# Patient Record
Sex: FEMALE | Race: WHITE | NOT HISPANIC OR LATINO
[De-identification: names, ages, dates, MRNs, and addresses within clinical notes are randomized per-mention and may not be internally consistent; named-entity substitution may affect disease eponyms.]

---

## 2018-10-29 ENCOUNTER — APPOINTMENT (OUTPATIENT)
Dept: INTERNAL MEDICINE | Facility: CLINIC | Age: 53
End: 2018-10-29
Payer: COMMERCIAL

## 2018-10-29 ENCOUNTER — NON-APPOINTMENT (OUTPATIENT)
Age: 53
End: 2018-10-29

## 2018-10-29 VITALS
TEMPERATURE: 98.3 F | SYSTOLIC BLOOD PRESSURE: 120 MMHG | WEIGHT: 143 LBS | HEART RATE: 79 BPM | BODY MASS INDEX: 28.45 KG/M2 | DIASTOLIC BLOOD PRESSURE: 70 MMHG | OXYGEN SATURATION: 98 % | HEIGHT: 59.5 IN

## 2018-10-29 DIAGNOSIS — Z82.69 FAMILY HISTORY OF OTHER DISEASES OF THE MUSCULOSKELETAL SYSTEM AND CONNECTIVE TISSUE: ICD-10-CM

## 2018-10-29 DIAGNOSIS — M19.049 PRIMARY OSTEOARTHRITIS, UNSPECIFIED HAND: ICD-10-CM

## 2018-10-29 DIAGNOSIS — Z11.3 ENCOUNTER FOR SCREENING FOR INFECTIONS WITH A PREDOMINANTLY SEXUAL MODE OF TRANSMISSION: ICD-10-CM

## 2018-10-29 DIAGNOSIS — L30.9 DERMATITIS, UNSPECIFIED: ICD-10-CM

## 2018-10-29 PROCEDURE — G0444 DEPRESSION SCREEN ANNUAL: CPT

## 2018-10-29 PROCEDURE — 36415 COLL VENOUS BLD VENIPUNCTURE: CPT

## 2018-10-29 PROCEDURE — 93000 ELECTROCARDIOGRAM COMPLETE: CPT

## 2018-10-29 PROCEDURE — 99386 PREV VISIT NEW AGE 40-64: CPT | Mod: 25

## 2018-10-29 NOTE — HEALTH RISK ASSESSMENT
[No falls in past year] : Patient reported no falls in the past year [0] : 2) Feeling down, depressed, or hopeless: Not at all (0) [Patient reported mammogram was normal] : Patient reported mammogram was normal [Patient reported PAP Smear was normal] : Patient reported PAP Smear was normal [Patient declined colonoscopy] : Patient declined colonoscopy [HIV Test offered] : HIV Test offered [Hepatitis C test offered] : Hepatitis C test offered [None] : None [Employed] : employed [] :  [] : No [MammogramDate] : 12/17 [PapSmearDate] : 12/17

## 2018-10-29 NOTE — HISTORY OF PRESENT ILLNESS
[de-identified] : 54 y/o woman presents for initial visit to [facility] care w internal medicine. she feels well overall currently. \par her only recurrent concern is some joint deformity of R hand w index finger the most prominent w nodules at DIP joint. mild pain occasionally, no swelling. she says she saw an ortho hand specialist who indicated it is likely due to OA and 'nothing to do for it'. family hx of OA. she has no other joint pains. she does note tingling and mild numbness in R first 3 digits at the distal tip at times. she notes she sleeps on her hand frequently. \par she has not seen an internal medicine MD in many years, no recent lab work done. \par \par she usually sees her GYN yearly, and has been UTD w mammography done at a center in [place]. \par she has not had screening colonoscopy as yet and she is hesitant to do so

## 2018-10-29 NOTE — ASSESSMENT
[FreeTextEntry1] : discussed w pt \par \par check routine labs as below. she would like STD screening. \par diet, exercise , some weight loss advised \par \par reminded her to see GYN and have mammography done this year \par \par she declines influenza or other vaccines \par \par discussed OA of hands. advised trial of wearing wrist brace for stability, possibly CTS. referred to rhmeumatology if she has worsening symptoms, gave info. may use NSAIDs prn for mild joint pains. check ESR, CCP, RF as precaution, though on exam most c/w OA and not RA. \par \par will give trial of medium potency steroid for L LE likely eczema \par \par advised on need for screening colonoscopy and importance of this. referred to GI, she plans to make appt for initial consult. \par \par RTO 6 months for routine f/u or earlier prn if any new concerns

## 2018-10-29 NOTE — PHYSICAL EXAM
[No Acute Distress] : no acute distress [Well Nourished] : well nourished [Well Developed] : well developed [Well-Appearing] : well-appearing [Normal Voice/Communication] : normal voice/communication [Normal Sclera/Conjunctiva] : normal sclera/conjunctiva [PERRL] : pupils equal round and reactive to light [Normal Outer Ear/Nose] : the outer ears and nose were normal in appearance [Normal Oropharynx] : the oropharynx was normal [Normal TMs] : both tympanic membranes were normal [No JVD] : no jugular venous distention [Supple] : supple [No Lymphadenopathy] : no lymphadenopathy [Thyroid Normal, No Nodules] : the thyroid was normal and there were no nodules present [No Respiratory Distress] : no respiratory distress  [Clear to Auscultation] : lungs were clear to auscultation bilaterally [No Accessory Muscle Use] : no accessory muscle use [Normal Rate] : normal rate  [Regular Rhythm] : with a regular rhythm [Normal S1, S2] : normal S1 and S2 [No Murmur] : no murmur heard [No Carotid Bruits] : no carotid bruits [No Abdominal Bruit] : a ~M bruit was not heard ~T in the abdomen [No Varicosities] : no varicosities [Pedal Pulses Present] : the pedal pulses are present [No Edema] : there was no peripheral edema [No Extremity Clubbing/Cyanosis] : no extremity clubbing/cyanosis [Soft] : abdomen soft [Non Tender] : non-tender [Non-distended] : non-distended [No Masses] : no abdominal mass palpated [No HSM] : no HSM [Normal Bowel Sounds] : normal bowel sounds [Normal Supraclavicular Nodes] : no supraclavicular lymphadenopathy [Normal Posterior Cervical Nodes] : no posterior cervical lymphadenopathy [Normal Anterior Cervical Nodes] : no anterior cervical lymphadenopathy [No Spinal Tenderness] : no spinal tenderness [Grossly Normal Strength/Tone] : grossly normal strength/tone [Normal Gait] : normal gait [Coordination Grossly Intact] : coordination grossly intact [No Focal Deficits] : no focal deficits [Deep Tendon Reflexes (DTR)] : deep tendon reflexes were 2+ and symmetric [Speech Grossly Normal] : speech grossly normal [Normal Affect] : the affect was normal [Normal Mood] : the mood was normal [Normal Insight/Judgement] : insight and judgment were intact [de-identified] : 1st DIP joint w significant deviation and apparent heberdens nodes, R index finger worse than L. no warmth or synovitis  [de-identified] : small erythematous patch L lower leg w excoriations c/w eczema

## 2018-10-29 NOTE — REVIEW OF SYSTEMS
[Negative] : Heme/Lymph [Joint Stiffness] : joint stiffness [Joint Swelling] : no joint swelling [Muscle Weakness] : no muscle weakness [Muscle Pain] : no muscle pain [Back Pain] : no back pain [Itching] : Itching [Mole Changes] : no mole changes [Hair Changes] : no hair changes [Skin Rash] : skin rash

## 2019-01-24 LAB
25(OH)D3 SERPL-MCNC: 24.8 NG/ML
ALBUMIN SERPL ELPH-MCNC: 4.3 G/DL
ALP BLD-CCNC: 94 U/L
ALT SERPL-CCNC: 23 U/L
ANION GAP SERPL CALC-SCNC: 14 MMOL/L
APPEARANCE: CLEAR
AST SERPL-CCNC: 23 U/L
BASOPHILS # BLD AUTO: 0.04 K/UL
BASOPHILS NFR BLD AUTO: 0.6 %
BILIRUB SERPL-MCNC: 0.2 MG/DL
BILIRUBIN URINE: NEGATIVE
BLOOD URINE: NEGATIVE
BUN SERPL-MCNC: 14 MG/DL
C TRACH RRNA SPEC QL NAA+PROBE: NOT DETECTED
CALCIUM SERPL-MCNC: 9.3 MG/DL
CCP AB SER IA-ACNC: <8 UNITS
CHLORIDE SERPL-SCNC: 106 MMOL/L
CHOLEST SERPL-MCNC: 210 MG/DL
CHOLEST/HDLC SERPL: 3 RATIO
CO2 SERPL-SCNC: 24 MMOL/L
COLOR: YELLOW
CREAT SERPL-MCNC: 0.72 MG/DL
EOSINOPHIL # BLD AUTO: 0.39 K/UL
EOSINOPHIL NFR BLD AUTO: 5.9 %
ERYTHROCYTE [SEDIMENTATION RATE] IN BLOOD BY WESTERGREN METHOD: 6 MM/HR
GLUCOSE QUALITATIVE U: NEGATIVE MG/DL
GLUCOSE SERPL-MCNC: 76 MG/DL
HBA1C MFR BLD HPLC: 5.6 %
HCT VFR BLD CALC: 40.8 %
HCV AB SER QL: NONREACTIVE
HCV S/CO RATIO: 0.17 S/CO
HDLC SERPL-MCNC: 71 MG/DL
HGB BLD-MCNC: 13.2 G/DL
HIV1+2 AB SPEC QL IA.RAPID: NONREACTIVE
IMM GRANULOCYTES NFR BLD AUTO: 0.2 %
KETONES URINE: NEGATIVE
LDLC SERPL CALC-MCNC: 129 MG/DL
LEUKOCYTE ESTERASE URINE: NEGATIVE
LYMPHOCYTES # BLD AUTO: 2 K/UL
LYMPHOCYTES NFR BLD AUTO: 30.3 %
MAN DIFF?: NORMAL
MCHC RBC-ENTMCNC: 30.3 PG
MCHC RBC-ENTMCNC: 32.4 GM/DL
MCV RBC AUTO: 93.6 FL
MONOCYTES # BLD AUTO: 0.46 K/UL
MONOCYTES NFR BLD AUTO: 7 %
N GONORRHOEA RRNA SPEC QL NAA+PROBE: NOT DETECTED
NEUTROPHILS # BLD AUTO: 3.69 K/UL
NEUTROPHILS NFR BLD AUTO: 56 %
NITRITE URINE: NEGATIVE
PH URINE: 6
PLATELET # BLD AUTO: 203 K/UL
POTASSIUM SERPL-SCNC: 4.4 MMOL/L
PROT SERPL-MCNC: 7 G/DL
PROTEIN URINE: NEGATIVE MG/DL
RBC # BLD: 4.36 M/UL
RBC # FLD: 14.2 %
RF+CCP IGG SER-IMP: NEGATIVE
RHEUMATOID FACT SER QL: <10 IU/ML
SODIUM SERPL-SCNC: 144 MMOL/L
SOURCE AMPLIFICATION: NORMAL
SPECIFIC GRAVITY URINE: 1.01
T PALLIDUM AB SER QL IA: NEGATIVE
T4 FREE SERPL-MCNC: 1.2 NG/DL
TRIGL SERPL-MCNC: 51 MG/DL
TSH SERPL-ACNC: 1.32 UIU/ML
UROBILINOGEN URINE: NEGATIVE MG/DL
WBC # FLD AUTO: 6.59 K/UL

## 2019-12-10 ENCOUNTER — APPOINTMENT (OUTPATIENT)
Dept: ULTRASOUND IMAGING | Facility: CLINIC | Age: 54
End: 2019-12-10
Payer: COMMERCIAL

## 2019-12-10 ENCOUNTER — OUTPATIENT (OUTPATIENT)
Dept: OUTPATIENT SERVICES | Facility: HOSPITAL | Age: 54
LOS: 1 days | End: 2019-12-10
Payer: COMMERCIAL

## 2019-12-10 DIAGNOSIS — Z00.8 ENCOUNTER FOR OTHER GENERAL EXAMINATION: ICD-10-CM

## 2019-12-10 PROCEDURE — 20604 DRAIN/INJ JOINT/BURSA W/US: CPT

## 2019-12-10 PROCEDURE — 20604 DRAIN/INJ JOINT/BURSA W/US: CPT | Mod: LT

## 2020-02-18 ENCOUNTER — APPOINTMENT (OUTPATIENT)
Dept: SURGERY | Facility: CLINIC | Age: 55
End: 2020-02-18

## 2020-02-25 ENCOUNTER — APPOINTMENT (OUTPATIENT)
Dept: BREAST CENTER | Facility: CLINIC | Age: 55
End: 2020-02-25
Payer: COMMERCIAL

## 2020-02-25 VITALS
TEMPERATURE: 97.9 F | WEIGHT: 143 LBS | DIASTOLIC BLOOD PRESSURE: 82 MMHG | SYSTOLIC BLOOD PRESSURE: 146 MMHG | BODY MASS INDEX: 28.07 KG/M2 | HEIGHT: 60 IN | HEART RATE: 78 BPM

## 2020-02-25 DIAGNOSIS — N64.59 OTHER SIGNS AND SYMPTOMS IN BREAST: ICD-10-CM

## 2020-02-25 DIAGNOSIS — Z87.39 PERSONAL HISTORY OF OTHER DISEASES OF THE MUSCULOSKELETAL SYSTEM AND CONNECTIVE TISSUE: ICD-10-CM

## 2020-02-25 DIAGNOSIS — N63.20 UNSPECIFIED LUMP IN THE LEFT BREAST, UNSPECIFIED QUADRANT: ICD-10-CM

## 2020-02-25 PROCEDURE — 99204 OFFICE O/P NEW MOD 45 MIN: CPT

## 2020-02-26 PROBLEM — N63.20 LEFT BREAST LUMP: Status: ACTIVE | Noted: 2020-02-25

## 2020-02-26 PROBLEM — Z87.39 HISTORY OF ARTHRITIS: Status: RESOLVED | Noted: 2020-02-25 | Resolved: 2020-02-26

## 2020-02-26 NOTE — HISTORY OF PRESENT ILLNESS
[FreeTextEntry1] : I had the pleasure of seeing AUDREY DILLON  in the office today for a new breast evaluation secondary to palpable right breast lump.\par \par Audrey is  a wanda 53 yo female who is in overall good health.  She states she felt the left breast lump in January.  The palpable left breast mass measuring 1 cm at the 11 o'clock region.  She is here for further evaluation and work up of nodule.\par \par She denies skin changes or nipple discharge. She denies headaches, blurry vision, chest pain, SOB, abdominal pain, joint aches or difficult walking.  Palpable nodule in the left breast 11 area.\par \par  with 1st delivery at 30.  Menses began at age 12.\par She denies personal history of malignancy.\par She denies family history of malignancy.\par \par Imaging:  [facility]\par 6/10/2019  Bilateral screening mammogram and ultrasound\par Impression:  No mammographic or sonographic evidence of malignancy.  BIRADS 2 benign\par \par We reviewed and discussed clinical exam and recent imaging.  There is a palpable 1 cm nodule in the left breast 11 area 10 cmFN.  It is freely mobile and unattached to the chest wall.  She states she felt the nodule get bigger in 2020.  Recommendation for left breast ultrasound to evaluate nodule and will discuss result via mobile.  If imaging is benign then follow up in 6 months for clinical breast exam.  She understands and agrees to plan.  All questions answered.

## 2020-02-26 NOTE — PHYSICAL EXAM
[EOMI] : extra ocular movement intact [Atraumatic] : atraumatic [Normocephalic] : normocephalic [PERRL] : pupils equal, round and reactive to light [Sclera nonicteric] : sclera nonicteric [Supple] : supple [Examined in the supine and seated position] : examined in the supine and seated position [No Supraclavicular Adenopathy] : no supraclavicular adenopathy [No dominant masses] : no dominant masses left breast [No dominant masses] : no dominant masses in right breast  [No Nipple Retraction] : no left nipple retraction [No Nipple Discharge] : no left nipple discharge [Breast Nipple Inversion] : nipples not inverted [Breast Nipple Retraction] : nipples not retracted [Breast Nipple Flattening] : nipples not flattened [Breast Nipple Fissures] : nipples not fissured [Breast Abnormal Lactation (Galactorrhea)] : no galactorrhea [Breast Abnormal Secretion Bloody Fluid] : no bloody discharge [Breast Abnormal Secretion Serous Fluid] : no serous discharge [Breast Abnormal Secretion Opalescent Fluid] : no milky discharge [No Axillary Lymphadenopathy] : no left axillary lymphadenopathy [No Ulceration] : no ulceration [No Rashes] : no rashes [No Edema] : no edema [de-identified] : No supraclavicular or axillary adenopathy. No dominant masses, normal to palpation. Everted nipple without discharge. No skin changes.\par \par  [de-identified] : No supraclavicular or axillary adenopathy. Left 11:00 1 cm nodule. Everted nipple without discharge. No skin changes.\par \par

## 2020-02-26 NOTE — ASSESSMENT
[FreeTextEntry1] : 55 yo presents for palpable left breast lump in 11 o'clcok area.  This correlates with findings on ultrasound from 6/10/2019 as a 7 mm sebaceous cyst.  Given patient states this has grown in size, recommendation for ultrasound to reevaluate the area.  \par 1.  US breast to evaluate palpable lump\par 2.  Follow up in 6 months if stable and benign

## 2020-08-24 ENCOUNTER — APPOINTMENT (OUTPATIENT)
Dept: SURGERY | Facility: CLINIC | Age: 55
End: 2020-08-24

## 2020-10-20 ENCOUNTER — APPOINTMENT (OUTPATIENT)
Dept: INTERNAL MEDICINE | Facility: CLINIC | Age: 55
End: 2020-10-20
Payer: COMMERCIAL

## 2020-10-20 ENCOUNTER — NON-APPOINTMENT (OUTPATIENT)
Age: 55
End: 2020-10-20

## 2020-10-20 ENCOUNTER — LABORATORY RESULT (OUTPATIENT)
Age: 55
End: 2020-10-20

## 2020-10-20 VITALS
WEIGHT: 139.6 LBS | TEMPERATURE: 98.3 F | SYSTOLIC BLOOD PRESSURE: 130 MMHG | BODY MASS INDEX: 27.41 KG/M2 | HEART RATE: 83 BPM | HEIGHT: 60 IN | DIASTOLIC BLOOD PRESSURE: 74 MMHG | OXYGEN SATURATION: 97 %

## 2020-10-20 DIAGNOSIS — Z00.00 ENCOUNTER FOR GENERAL ADULT MEDICAL EXAMINATION W/OUT ABNORMAL FINDINGS: ICD-10-CM

## 2020-10-20 DIAGNOSIS — N60.19 DIFFUSE CYSTIC MASTOPATHY OF UNSPECIFIED BREAST: ICD-10-CM

## 2020-10-20 DIAGNOSIS — Z11.59 ENCOUNTER FOR SCREENING FOR OTHER VIRAL DISEASES: ICD-10-CM

## 2020-10-20 PROCEDURE — 36415 COLL VENOUS BLD VENIPUNCTURE: CPT

## 2020-10-20 PROCEDURE — 99396 PREV VISIT EST AGE 40-64: CPT | Mod: 25

## 2020-10-20 PROCEDURE — 93000 ELECTROCARDIOGRAM COMPLETE: CPT | Mod: 59

## 2020-10-20 PROCEDURE — 99072 ADDL SUPL MATRL&STAF TM PHE: CPT

## 2020-10-20 PROCEDURE — G0444 DEPRESSION SCREEN ANNUAL: CPT | Mod: NC,59

## 2020-10-20 NOTE — HEALTH RISK ASSESSMENT
[No falls in past year] : Patient reported no falls in the past year [0] : 2) Feeling down, depressed, or hopeless: Not at all (0) [Patient reported mammogram was normal] : Patient reported mammogram was normal [Patient reported PAP Smear was normal] : Patient reported PAP Smear was normal [Patient declined colonoscopy] : Patient declined colonoscopy [Employed] : employed [None] : None [] :  [No] : In the past 12 months have you used drugs other than those required for medical reasons? No [] : No [PapSmearDate] : 09/20 [MammogramDate] : 10/20

## 2020-10-20 NOTE — PHYSICAL EXAM
[No Acute Distress] : no acute distress [Well Nourished] : well nourished [Well Developed] : well developed [Well-Appearing] : well-appearing [Normal Voice/Communication] : normal voice/communication [Normal Sclera/Conjunctiva] : normal sclera/conjunctiva [PERRL] : pupils equal round and reactive to light [Normal Outer Ear/Nose] : the outer ears and nose were normal in appearance [Normal Oropharynx] : the oropharynx was normal [Normal TMs] : both tympanic membranes were normal [No JVD] : no jugular venous distention [Supple] : supple [No Lymphadenopathy] : no lymphadenopathy [Thyroid Normal, No Nodules] : the thyroid was normal and there were no nodules present [No Respiratory Distress] : no respiratory distress  [Clear to Auscultation] : lungs were clear to auscultation bilaterally [No Accessory Muscle Use] : no accessory muscle use [Normal Rate] : normal rate  [Regular Rhythm] : with a regular rhythm [Normal S1, S2] : normal S1 and S2 [No Murmur] : no murmur heard [No Abdominal Bruit] : a ~M bruit was not heard ~T in the abdomen [No Carotid Bruits] : no carotid bruits [No Varicosities] : no varicosities [Pedal Pulses Present] : the pedal pulses are present [No Edema] : there was no peripheral edema [No Extremity Clubbing/Cyanosis] : no extremity clubbing/cyanosis [Non Tender] : non-tender [Soft] : abdomen soft [Non-distended] : non-distended [No Masses] : no abdominal mass palpated [No HSM] : no HSM [Normal Bowel Sounds] : normal bowel sounds [Normal Supraclavicular Nodes] : no supraclavicular lymphadenopathy [Normal Posterior Cervical Nodes] : no posterior cervical lymphadenopathy [Normal Anterior Cervical Nodes] : no anterior cervical lymphadenopathy [No Spinal Tenderness] : no spinal tenderness [Grossly Normal Strength/Tone] : grossly normal strength/tone [Normal Gait] : normal gait [Coordination Grossly Intact] : coordination grossly intact [No Focal Deficits] : no focal deficits [Deep Tendon Reflexes (DTR)] : deep tendon reflexes were 2+ and symmetric [Normal Affect] : the affect was normal [Speech Grossly Normal] : speech grossly normal [Normal Mood] : the mood was normal [Normal Insight/Judgement] : insight and judgment were intact [Declined Breast Exam] : declined breast exam  [Alert and Oriented x3] : oriented to person, place, and time

## 2020-10-20 NOTE — ASSESSMENT
[FreeTextEntry1] : discussed w pt \par \par check routine labs as below.\par \par cont diet, exercise , weight control \par \par she is UTD w GYN screening \par mammography and breast US done recently ordered by her GYN, she was told breast cyst is stable, no new findings \par \par she declines influenza or other vaccines \par \par advised on need for screening colonoscopy and importance of this. she is not interested at this time, but also discussed CRC screening option w [facility] which she is interested in. she will check w her insurance \par \par RTO yearly for routine exam or earlier prn if any new concerns

## 2020-10-20 NOTE — REVIEW OF SYSTEMS
[Joint Stiffness] : joint stiffness [Negative] : Heme/Lymph [Joint Swelling] : no joint swelling [Muscle Weakness] : no muscle weakness [Muscle Pain] : no muscle pain [Back Pain] : no back pain [Mole Changes] : no mole changes [Itching] : no itching [Hair Changes] : no hair changes [Skin Rash] : no skin rash

## 2020-11-06 LAB
ALBUMIN SERPL ELPH-MCNC: 4.5 G/DL
ALP BLD-CCNC: 78 U/L
ALT SERPL-CCNC: 23 U/L
ANION GAP SERPL CALC-SCNC: 11 MMOL/L
APPEARANCE: CLEAR
AST SERPL-CCNC: 21 U/L
BASOPHILS # BLD AUTO: 0.06 K/UL
BASOPHILS NFR BLD AUTO: 0.8 %
BILIRUB SERPL-MCNC: 0.3 MG/DL
BILIRUBIN URINE: NEGATIVE
BLOOD URINE: NEGATIVE
BUN SERPL-MCNC: 13 MG/DL
CALCIUM SERPL-MCNC: 9.2 MG/DL
CHLORIDE SERPL-SCNC: 103 MMOL/L
CHOLEST SERPL-MCNC: 192 MG/DL
CO2 SERPL-SCNC: 25 MMOL/L
COLOR: NORMAL
CREAT SERPL-MCNC: 0.7 MG/DL
EOSINOPHIL # BLD AUTO: 0.31 K/UL
EOSINOPHIL NFR BLD AUTO: 3.9 %
ESTIMATED AVERAGE GLUCOSE: 111 MG/DL
GLUCOSE QUALITATIVE U: NEGATIVE
GLUCOSE SERPL-MCNC: 85 MG/DL
HBA1C MFR BLD HPLC: 5.5 %
HCT VFR BLD CALC: 38.4 %
HDLC SERPL-MCNC: 77 MG/DL
HGB BLD-MCNC: 12.8 G/DL
IMM GRANULOCYTES NFR BLD AUTO: 0.3 %
KETONES URINE: NEGATIVE
LDLC SERPL CALC-MCNC: 98 MG/DL
LEUKOCYTE ESTERASE URINE: ABNORMAL
LYMPHOCYTES # BLD AUTO: 2.39 K/UL
LYMPHOCYTES NFR BLD AUTO: 30.3 %
MAN DIFF?: NORMAL
MCHC RBC-ENTMCNC: 30.7 PG
MCHC RBC-ENTMCNC: 33.3 GM/DL
MCV RBC AUTO: 92.1 FL
MONOCYTES # BLD AUTO: 0.61 K/UL
MONOCYTES NFR BLD AUTO: 7.7 %
NEUTROPHILS # BLD AUTO: 4.49 K/UL
NEUTROPHILS NFR BLD AUTO: 57 %
NITRITE URINE: NEGATIVE
NONHDLC SERPL-MCNC: 115 MG/DL
PH URINE: 6
PLATELET # BLD AUTO: 216 K/UL
POTASSIUM SERPL-SCNC: 3.6 MMOL/L
PROT SERPL-MCNC: 6.6 G/DL
PROTEIN URINE: NEGATIVE
RBC # BLD: 4.17 M/UL
RBC # FLD: 13 %
SARS-COV-2 IGG SERPL IA-ACNC: 3.78 INDEX
SARS-COV-2 IGG SERPL QL IA: POSITIVE
SODIUM SERPL-SCNC: 139 MMOL/L
SPECIFIC GRAVITY URINE: 1.01
TRIGL SERPL-MCNC: 85 MG/DL
TSH SERPL-ACNC: 1.04 UIU/ML
UROBILINOGEN URINE: NORMAL
WBC # FLD AUTO: 7.88 K/UL

## 2020-12-16 ENCOUNTER — APPOINTMENT (OUTPATIENT)
Dept: GASTROENTEROLOGY | Facility: CLINIC | Age: 55
End: 2020-12-16
Payer: COMMERCIAL

## 2020-12-16 VITALS
OXYGEN SATURATION: 98 % | BODY MASS INDEX: 27.29 KG/M2 | DIASTOLIC BLOOD PRESSURE: 80 MMHG | HEIGHT: 60 IN | TEMPERATURE: 97.1 F | WEIGHT: 139 LBS | HEART RATE: 92 BPM | SYSTOLIC BLOOD PRESSURE: 132 MMHG

## 2020-12-16 DIAGNOSIS — K21.9 GASTRO-ESOPHAGEAL REFLUX DISEASE W/OUT ESOPHAGITIS: ICD-10-CM

## 2020-12-16 PROCEDURE — 99072 ADDL SUPL MATRL&STAF TM PHE: CPT

## 2020-12-16 PROCEDURE — 99204 OFFICE O/P NEW MOD 45 MIN: CPT

## 2020-12-16 NOTE — REASON FOR VISIT
[Consultation] : a consultation visit [FreeTextEntry1] : for evaluation of heartburn, indigestion and nausea.

## 2020-12-16 NOTE — CONSULT LETTER
[Dear  ___] : Dear  [unfilled], [Consult Letter:] : I had the pleasure of evaluating your patient, [unfilled]. [( Thank you for referring [unfilled] for consultation for _____ )] : Thank you for referring [unfilled] for consultation for [unfilled] [Please see my note below.] : Please see my note below. [Sincerely,] : Sincerely, [FreeTextEntry2] : Dr. Isaac Jamil [FreeTextEntry3] : Jordy Ricketts M.D.

## 2020-12-16 NOTE — HISTORY OF PRESENT ILLNESS
[FreeTextEntry1] : Office consultation on 12/16/2020.\par \par The patient is a 55-year-old woman evaluated for consultation for evaluation of heartburn, indigestion and nausea.  PMD: Dr. Isaac Jamil.\par \par The patient has experienced complaints of heartburn, indigestion, nausea and intermittent vomiting starting at the end of 10/2020.  Current symptoms of approximately 6 weeks duration.  Recalls somewhat sudden onset.  Felt fine prior to this time.  Precipitating factors such as diet change, new medication or anything else not reported.\par \par Complains of heartburn and indigestion.  Describes typical retrosternal discomfort and burning described as heartburn.  Feels sense of indigestion.  Reports frequent nausea with nonbloody emesis of mostly foam 1-2 times daily.  Has never observed blood.  Occasionally has emesis of food.  Less frequently reports regurgitation.  Symptoms predominantly postprandial.  Particular foods not reported.  Does not describe symptoms at night when recumbent.  Does not describe any exertional related symptoms.  Patient complains of frequent belching.\par \par Appetite fair.  Denies weight change.  Denies dysphagia per se.  Swallows liquids without choking, coughing or nasal regurgitation.  Swallows solid food without difficulty.  Does not clearly indicate sense of retrosternal bolus hang up.  However, describes sensation of retrosternal fullness after eating and points to her neck describing "it feels like it is up to here" after eating.  As noted, experiences frequent nausea and can have emesis few times daily mostly of foam.\par \par No abdominal pain except for 2 isolated self-limited episodes of lower abdominal discomfort.  Typically has daily formed bowel movements without any complaints of diarrhea, constipation, change in bowel habit or rectal bleeding.\par \par On her own the patient has taken Nexium OTC, [place] and likely other antacids without any improvement.  Was prescribed pantoprazole 20 mg daily with no improvement.  She subsequently consulted with a gastroenterologist who prescribed pantoprazole 40 mg twice daily which she has been on for the past 3 weeks with no improvement in symptoms.\par \par Patient reports no past history of digestive illness.  The patient has never had an upper endoscopy or colonoscopy.  Family history of colon cancer is not reported.\par \par Patient denies any complaints of exertional related chest pain, dyspnea or palpitations.\par \par Denies NSAIDs.

## 2020-12-16 NOTE — ASSESSMENT
[FreeTextEntry1] : Impression:\par \par #1 heartburn/dyspepsia-approximate 6 week history of dominant complaint of heartburn and indigestion consistent with GERD. Reports sudden onset of symptoms at end 10/2020 although no evident precipitating factor reported at that time. So far has not derived any improvement with b.i.d. PPI. Not experiencing dysphagia per se but does describe the sensation of postprandial retrosternal fullness and has frequent nausea with emesis mostly of foam-will evaluate for esophageal dysmotility.  In view of refractory symptoms evaluate for any possibility of neoplastic process.\par \par #2 aerophagia- experiencing frequent belching and during exam noted to have repetitive air swallowing. Consistent with aerophagia likely exacerbated by anxiety superimposed on dominant GERD symptoms of heartburn and indigestion.\par \par #3 colon cancer screening.

## 2020-12-16 NOTE — PHYSICAL EXAM
[General Appearance - Alert] : alert [General Appearance - In No Acute Distress] : in no acute distress [General Appearance - Well Nourished] : well nourished [General Appearance - Well Developed] : well developed [General Appearance - Well-Appearing] : healthy appearing [Neck Appearance] : the appearance of the neck was normal [Neck Cervical Mass (___cm)] : no neck mass was observed [Respiration, Rhythm And Depth] : normal respiratory rhythm and effort [Exaggerated Use Of Accessory Muscles For Inspiration] : no accessory muscle use [Auscultation Breath Sounds / Voice Sounds] : lungs were clear to auscultation bilaterally [Heart Rate And Rhythm] : heart rate was normal and rhythm regular [Heart Sounds] : normal S1 and S2 [Heart Sounds Gallop] : no gallops [Murmurs] : no murmurs [Heart Sounds Pericardial Friction Rub] : no pericardial rub [Edema] : there was no peripheral edema [Bowel Sounds] : normal bowel sounds [Abdomen Soft] : soft [Abdomen Tenderness] : non-tender [] : no hepato-splenomegaly [Abdomen Mass (___ Cm)] : no abdominal mass palpated [Abdomen Hernia] : no hernia was discovered [Cervical Lymph Nodes Enlarged Posterior Bilaterally] : posterior cervical [Cervical Lymph Nodes Enlarged Anterior Bilaterally] : anterior cervical [Supraclavicular Lymph Nodes Enlarged Bilaterally] : supraclavicular [No CVA Tenderness] : no ~M costovertebral angle tenderness [Abnormal Walk] : normal gait [Nail Clubbing] : no clubbing  or cyanosis of the fingernails [Skin Color & Pigmentation] : normal skin color and pigmentation [Oriented To Time, Place, And Person] : oriented to person, place, and time [Impaired Insight] : insight and judgment were intact [Affect] : the affect was normal [Mood] : the mood was normal [FreeTextEntry1] : Somewhat anxious but otherwise comfortable/NAD/nontoxic appearing.  Of note, repetitive air swallowing and belching noted throughout exam.

## 2021-01-07 DIAGNOSIS — Z01.818 ENCOUNTER FOR OTHER PREPROCEDURAL EXAMINATION: ICD-10-CM

## 2021-01-11 ENCOUNTER — APPOINTMENT (OUTPATIENT)
Dept: DISASTER EMERGENCY | Facility: CLINIC | Age: 56
End: 2021-01-11

## 2021-01-13 LAB — SARS-COV-2 N GENE NPH QL NAA+PROBE: NOT DETECTED

## 2021-01-14 ENCOUNTER — OUTPATIENT (OUTPATIENT)
Dept: OUTPATIENT SERVICES | Facility: HOSPITAL | Age: 56
LOS: 1 days | Discharge: HOME | End: 2021-01-14
Payer: COMMERCIAL

## 2021-01-14 ENCOUNTER — APPOINTMENT (OUTPATIENT)
Dept: GASTROENTEROLOGY | Facility: HOSPITAL | Age: 56
End: 2021-01-14

## 2021-01-14 ENCOUNTER — RESULT REVIEW (OUTPATIENT)
Age: 56
End: 2021-01-14

## 2021-01-14 VITALS
OXYGEN SATURATION: 97 % | HEART RATE: 72 BPM | SYSTOLIC BLOOD PRESSURE: 105 MMHG | DIASTOLIC BLOOD PRESSURE: 63 MMHG | RESPIRATION RATE: 21 BRPM

## 2021-01-14 VITALS
WEIGHT: 138.89 LBS | SYSTOLIC BLOOD PRESSURE: 116 MMHG | OXYGEN SATURATION: 99 % | HEIGHT: 60 IN | DIASTOLIC BLOOD PRESSURE: 60 MMHG | TEMPERATURE: 99 F | HEART RATE: 74 BPM | RESPIRATION RATE: 17 BRPM

## 2021-01-14 DIAGNOSIS — R10.13 EPIGASTRIC PAIN: ICD-10-CM

## 2021-01-14 PROCEDURE — 88312 SPECIAL STAINS GROUP 1: CPT | Mod: 26

## 2021-01-14 PROCEDURE — 88305 TISSUE EXAM BY PATHOLOGIST: CPT | Mod: 26

## 2021-01-14 PROCEDURE — 43239 EGD BIOPSY SINGLE/MULTIPLE: CPT

## 2021-01-19 LAB — SURGICAL PATHOLOGY STUDY: SIGNIFICANT CHANGE UP

## 2021-01-22 ENCOUNTER — NON-APPOINTMENT (OUTPATIENT)
Age: 56
End: 2021-01-22

## 2021-12-06 ENCOUNTER — APPOINTMENT (OUTPATIENT)
Dept: GASTROENTEROLOGY | Facility: CLINIC | Age: 56
End: 2021-12-06
Payer: COMMERCIAL

## 2021-12-06 VITALS
BODY MASS INDEX: 26.5 KG/M2 | WEIGHT: 135 LBS | OXYGEN SATURATION: 97 % | TEMPERATURE: 98.2 F | DIASTOLIC BLOOD PRESSURE: 60 MMHG | HEART RATE: 67 BPM | SYSTOLIC BLOOD PRESSURE: 126 MMHG | HEIGHT: 60 IN

## 2021-12-06 DIAGNOSIS — R10.13 EPIGASTRIC PAIN: ICD-10-CM

## 2021-12-06 DIAGNOSIS — K29.70 GASTRITIS, UNSPECIFIED, W/OUT BLEEDING: ICD-10-CM

## 2021-12-06 DIAGNOSIS — B96.81 GASTRITIS, UNSPECIFIED, W/OUT BLEEDING: ICD-10-CM

## 2021-12-06 PROCEDURE — 99203 OFFICE O/P NEW LOW 30 MIN: CPT

## 2021-12-06 PROCEDURE — 99213 OFFICE O/P EST LOW 20 MIN: CPT

## 2021-12-06 RX ORDER — BISMUTH SUBCITRATE POTASSIUM, METRONIDAZOLE, AND TETRACYCLINE HYDROCHLORIDE 140; 125; 125 MG/1; MG/1; MG/1
140-125-125 CAPSULE ORAL
Qty: 120 | Refills: 0 | Status: DISCONTINUED | COMMUNITY
Start: 2021-01-20 | End: 2021-12-06

## 2021-12-06 NOTE — ASSESSMENT
[FreeTextEntry1] : Impression: GERD with functional dyspepsia.  Possible anxiety component.  All testing negative to date.  H. pylori was incidental but has been eradicated nonetheless.\par \par Plan: Pantoprazole 40 mg p.o. daily.  Resume amitriptyline 10 mg nightly.  Increase to 20 mg after 1 week if no change.

## 2021-12-06 NOTE — HISTORY OF PRESENT ILLNESS
[Vomiting] : denies vomiting [Diarrhea] : denies diarrhea [Constipation] : denies constipation [Yellow Skin Or Eyes (Jaundice)] : denies jaundice [Abdominal Swelling] : denies abdominal swelling [Rectal Pain] : denies rectal pain [Heartburn] : heartburn [Nausea] : nausea [Abdominal Pain] : abdominal pain [de-identified] : 56-year-old female with no significant past medical history has been experiencing dyspeptic symptoms for over 1 year.  The symptoms came on rather suddenly in October 2020 including nausea, belching, heartburn, gurgling and gas in the upper abdomen particularly in the morning.  And globus sensation.  Patient had found out around that time that she had Covid antibodies in her blood although was never clinically ill.  No GI symptoms prior to that.  Denies dysphagia/odynophagia, early satiety, vomiting, weight loss, change in bowel habits.\par \par Patient has seen several gastroenterologists and has had numerous tests and tried numerous prescriptions without success.  She had an upper endoscopy January 14 of 2021 which showed mild gastric antrum erythema.  Biopsies were positive for H. pylori she was treated with Pylera and subsequent breath test was negative.  She had a screening colonoscopy in August 2021 which was normal and she was given a 10-year recall as she has no colorectal cancer risk factors.  She has had a CT scan, sonogram, gastric emptying study, and esophagram all negative as well.  She has been on various OTC and prescription PPIs with only partial relief at best.  Most recently was on rabeprazole 20 mg which she is not taking.  Of only antiacid therapy she had the most success with pantoprazole 40 mg.  She had been given amitriptyline 10 mg nightly at one point but she only took it for a week and does not recall why she stopped.

## 2021-12-06 NOTE — PHYSICAL EXAM
[General Appearance - Alert] : alert [General Appearance - In No Acute Distress] : in no acute distress [Neck Appearance] : the appearance of the neck was normal [Neck Cervical Mass (___cm)] : no neck mass was observed [Jugular Venous Distention Increased] : there was no jugular-venous distention [Thyroid Diffuse Enlargement] : the thyroid was not enlarged [Thyroid Nodule] : there were no palpable thyroid nodules [Auscultation Breath Sounds / Voice Sounds] : lungs were clear to auscultation bilaterally [Heart Rate And Rhythm] : heart rate was normal and rhythm regular [Heart Sounds] : normal S1 and S2 [Heart Sounds Gallop] : no gallops [Murmurs] : no murmurs [Heart Sounds Pericardial Friction Rub] : no pericardial rub [Full Pulse] : the pedal pulses are present [Edema] : there was no peripheral edema [Bowel Sounds] : normal bowel sounds [Abdomen Soft] : soft [Abdomen Tenderness] : non-tender [] : no hepato-splenomegaly [Abdomen Mass (___ Cm)] : no abdominal mass palpated

## 2022-01-05 ENCOUNTER — NON-APPOINTMENT (OUTPATIENT)
Age: 57
End: 2022-01-05

## 2022-04-11 PROBLEM — Z11.59 SCREENING FOR VIRAL DISEASE: Status: ACTIVE | Noted: 2020-10-20

## 2023-03-01 PROBLEM — K42.9 UMBILICAL HERNIA: Status: ACTIVE | Noted: 2023-03-01

## 2023-03-01 PROBLEM — K44.9 HIATAL HERNIA: Status: ACTIVE | Noted: 2023-03-01

## 2023-03-02 ENCOUNTER — APPOINTMENT (OUTPATIENT)
Dept: SURGERY | Facility: CLINIC | Age: 58
End: 2023-03-02
Payer: COMMERCIAL

## 2023-03-02 VITALS
BODY MASS INDEX: 26.5 KG/M2 | SYSTOLIC BLOOD PRESSURE: 137 MMHG | HEART RATE: 73 BPM | WEIGHT: 135 LBS | OXYGEN SATURATION: 98 % | TEMPERATURE: 97.2 F | HEIGHT: 60 IN | RESPIRATION RATE: 17 BRPM | DIASTOLIC BLOOD PRESSURE: 80 MMHG

## 2023-03-02 DIAGNOSIS — K21.9 GASTRO-ESOPHAGEAL REFLUX DISEASE W/OUT ESOPHAGITIS: ICD-10-CM

## 2023-03-02 DIAGNOSIS — K44.9 DIAPHRAGMATIC HERNIA W/OUT OBSTRUCTION OR GANGRENE: ICD-10-CM

## 2023-03-02 DIAGNOSIS — K42.9 UMBILICAL HERNIA W/OUT OBSTRUCTION OR GANGRENE: ICD-10-CM

## 2023-03-02 PROCEDURE — 99204 OFFICE O/P NEW MOD 45 MIN: CPT

## 2023-03-02 RX ORDER — AMITRIPTYLINE HYDROCHLORIDE 10 MG/1
10 TABLET, FILM COATED ORAL
Qty: 60 | Refills: 5 | Status: DISCONTINUED | COMMUNITY
Start: 2021-12-06 | End: 2023-03-02

## 2023-03-02 RX ORDER — PANTOPRAZOLE 40 MG/1
40 TABLET, DELAYED RELEASE ORAL
Qty: 90 | Refills: 3 | Status: DISCONTINUED | COMMUNITY
Start: 2020-11-06 | End: 2023-03-02

## 2023-03-02 RX ORDER — DESOXIMETASONE 2.5 MG/G
0.25 CREAM TOPICAL TWICE DAILY
Qty: 1 | Refills: 0 | Status: DISCONTINUED | COMMUNITY
Start: 2018-10-29 | End: 2023-03-02

## 2023-03-02 NOTE — ASSESSMENT
[FreeTextEntry1] : Symptomatic hiatal hernia with reflux as demonstrated on upper GI and CT scan done 2 years prior.  Patient also had H. pylori he without esophagitis or esophageal mucosal changes 2 years prior.  Patient appears to be resistant to medical management and prefers the surgical route

## 2023-03-02 NOTE — PHYSICAL EXAM
[Normal Breath Sounds] : Normal breath sounds [Normal Heart Sounds] : normal heart sounds [No Rash or Lesion] : No rash or lesion [Alert] : alert [Oriented to Person] : oriented to person [Oriented to Place] : oriented to place [Oriented to Time] : oriented to time [Calm] : calm [de-identified] : WNL [de-identified] : WNL [de-identified] : MODEL [de-identified] : Normoactive bowel sounds, no hepatosplenomegaly, no masses, non-tender. [de-identified] : WNL ROM [de-identified] : WNL

## 2023-03-02 NOTE — CONSULT LETTER
[Dear  ___] : Dear  [unfilled], [Consult Letter:] : I had the pleasure of evaluating your patient, [unfilled]. [Please see my note below.] : Please see my note below. [Consult Closing:] : Thank you very much for allowing me to participate in the care of this patient.  If you have any questions, please do not hesitate to contact me. [Sincerely,] : Sincerely, [FreeTextEntry3] : Shakeel Herbert MD, FACS, FASMBS\par , Department of Surgery \par [facility] [DrTamia  ___] : Dr. MILLAN

## 2023-03-02 NOTE — HISTORY OF PRESENT ILLNESS
[de-identified] : Ms. AUDREY DILLON is a 57 year-old woman past history of GERD & dyspepsia for over 2 years.  \par \par CT abdomen w/contrast at outside facility (done 3/26/2021) shows small hiatal hernia with GERD & small fat-containing umbilical hernia, this was confirmed on endoscopy and upper GI.  Biopsy showed H. pylori which was treated with normal follow-up studies per patient.  There were no mucosal changes of the esophagus on upper endoscopy\par \par Patient has been taking famotidine and Gaviscon as recommended by GI.  She has been compliant with all behavioral methods of reflux control but continues to have daily symptoms.  Patient is here to explore surgical options.\par \par She denies significant past medical history and prior surgery

## 2023-03-02 NOTE — PLAN
[FreeTextEntry1] : Check upper GI\par Robot-assisted hiatal hernia repair with partial fundoplication if upper GI confirms.\par \par We discussed risks and benefits of the procedure, including recurrence rate of 10-20%, gas bloat, dysphagia, bleeding, infection, and damage to surrounding organs and structures. We discussed the possibility of mesh placement if the repair is under any tension. We discussed the postoperative liquid diet. The patient expressed excellent understanding of the procedure, its risks, and its limitations. All questions were answered and the patient agrees to proceed with surgery pending results of upper GI.  She also understands that further testing will be necessary if she feels a complete wrap is indicated\par \par -We will need a medical evaluation preoperatively.

## 2023-03-06 ENCOUNTER — APPOINTMENT (OUTPATIENT)
Dept: INTERNAL MEDICINE | Facility: CLINIC | Age: 58
End: 2023-03-06

## 2023-03-16 ENCOUNTER — APPOINTMENT (OUTPATIENT)
Dept: RADIOLOGY | Facility: HOSPITAL | Age: 58
End: 2023-03-16
Payer: COMMERCIAL

## 2023-03-16 ENCOUNTER — OUTPATIENT (OUTPATIENT)
Dept: OUTPATIENT SERVICES | Facility: HOSPITAL | Age: 58
LOS: 1 days | End: 2023-03-16
Payer: COMMERCIAL

## 2023-03-16 DIAGNOSIS — E66.01 MORBID (SEVERE) OBESITY DUE TO EXCESS CALORIES: ICD-10-CM

## 2023-03-16 PROCEDURE — 74240 X-RAY XM UPR GI TRC 1CNTRST: CPT

## 2023-03-16 PROCEDURE — 74240 X-RAY XM UPR GI TRC 1CNTRST: CPT | Mod: 26

## 2023-04-06 ENCOUNTER — APPOINTMENT (OUTPATIENT)
Dept: SURGERY | Facility: CLINIC | Age: 58
End: 2023-04-06
Payer: COMMERCIAL

## 2023-04-06 PROCEDURE — 99214 OFFICE O/P EST MOD 30 MIN: CPT | Mod: 95

## 2023-04-06 NOTE — REASON FOR VISIT
[Home] : at home, [unfilled] , at the time of the visit. [Medical Office: ([facility])___] : at the medical office located in  [Patient] : the patient [Self] : self

## 2023-04-06 NOTE — PLAN
[FreeTextEntry1] : Robot-assisted laparoscopic repair of hiatal hernia with partial fundoplication. \par \par We discussed risks and benefits of the procedure, including recurrence rate of 10-20%, gas bloat, dysphagia, bleeding, infection, and damage to surrounding organs and structures. We discussed the possibility of mesh placement if the repair is under any tension. We discussed the postoperative liquid diet. The patient expressed excellent understanding of the procedure, its risks, and its limitations. All questions were answered and the patient agrees to proceed with surgery.\par \par -Will need medical clearance preoperatively.

## 2023-04-06 NOTE — PHYSICAL EXAM
[Alert] : alert [Oriented to Person] : oriented to person [Oriented to Place] : oriented to place [Oriented to Time] : oriented to time [Calm] : calm [de-identified] : Understood consultation

## 2023-04-06 NOTE — HISTORY OF PRESENT ILLNESS
[de-identified] : Ms. AUDREY DILLON is a 57 year-old woman past history of GERD & dyspepsia for over 2 years. Patient taking famotidine & Gaviscon, compliant with all behavioral methods of reflux control but continues to have daily symptoms. Recently, had UGI completed. \par Upper GI confirmed the diagnosis of significant gastroesophageal reflux.\par \par Due to the chronicity and difficulty to treat her reflux symptoms patient is interested in proceeding with hiatal hernia repair and toupee fundoplication.\par \par

## 2023-04-21 ENCOUNTER — OUTPATIENT (OUTPATIENT)
Dept: OUTPATIENT SERVICES | Facility: HOSPITAL | Age: 58
LOS: 1 days | End: 2023-04-21
Payer: COMMERCIAL

## 2023-04-21 VITALS
RESPIRATION RATE: 16 BRPM | HEART RATE: 83 BPM | SYSTOLIC BLOOD PRESSURE: 124 MMHG | HEIGHT: 60 IN | WEIGHT: 138.89 LBS | OXYGEN SATURATION: 95 % | TEMPERATURE: 98 F | DIASTOLIC BLOOD PRESSURE: 77 MMHG

## 2023-04-21 DIAGNOSIS — K21.9 GASTRO-ESOPHAGEAL REFLUX DISEASE WITHOUT ESOPHAGITIS: ICD-10-CM

## 2023-04-21 DIAGNOSIS — Z98.890 OTHER SPECIFIED POSTPROCEDURAL STATES: Chronic | ICD-10-CM

## 2023-04-21 DIAGNOSIS — Z01.818 ENCOUNTER FOR OTHER PREPROCEDURAL EXAMINATION: ICD-10-CM

## 2023-04-21 DIAGNOSIS — K44.9 DIAPHRAGMATIC HERNIA WITHOUT OBSTRUCTION OR GANGRENE: ICD-10-CM

## 2023-04-21 LAB
ANION GAP SERPL CALC-SCNC: 10 MMOL/L — SIGNIFICANT CHANGE UP (ref 5–17)
BLD GP AB SCN SERPL QL: NEGATIVE — SIGNIFICANT CHANGE UP
BUN SERPL-MCNC: 18 MG/DL — SIGNIFICANT CHANGE UP (ref 7–23)
CALCIUM SERPL-MCNC: 9 MG/DL — SIGNIFICANT CHANGE UP (ref 8.4–10.5)
CHLORIDE SERPL-SCNC: 106 MMOL/L — SIGNIFICANT CHANGE UP (ref 96–108)
CO2 SERPL-SCNC: 24 MMOL/L — SIGNIFICANT CHANGE UP (ref 22–31)
CREAT SERPL-MCNC: 0.64 MG/DL — SIGNIFICANT CHANGE UP (ref 0.5–1.3)
EGFR: 102 ML/MIN/1.73M2 — SIGNIFICANT CHANGE UP
GLUCOSE SERPL-MCNC: 108 MG/DL — HIGH (ref 70–99)
HCT VFR BLD CALC: 38.1 % — SIGNIFICANT CHANGE UP (ref 34.5–45)
HGB BLD-MCNC: 12.6 G/DL — SIGNIFICANT CHANGE UP (ref 11.5–15.5)
MCHC RBC-ENTMCNC: 30.3 PG — SIGNIFICANT CHANGE UP (ref 27–34)
MCHC RBC-ENTMCNC: 33.1 GM/DL — SIGNIFICANT CHANGE UP (ref 32–36)
MCV RBC AUTO: 91.6 FL — SIGNIFICANT CHANGE UP (ref 80–100)
NRBC # BLD: 0 /100 WBCS — SIGNIFICANT CHANGE UP (ref 0–0)
PLATELET # BLD AUTO: 195 K/UL — SIGNIFICANT CHANGE UP (ref 150–400)
POTASSIUM SERPL-MCNC: 3.6 MMOL/L — SIGNIFICANT CHANGE UP (ref 3.5–5.3)
POTASSIUM SERPL-SCNC: 3.6 MMOL/L — SIGNIFICANT CHANGE UP (ref 3.5–5.3)
RBC # BLD: 4.16 M/UL — SIGNIFICANT CHANGE UP (ref 3.8–5.2)
RBC # FLD: 12.6 % — SIGNIFICANT CHANGE UP (ref 10.3–14.5)
RH IG SCN BLD-IMP: POSITIVE — SIGNIFICANT CHANGE UP
SODIUM SERPL-SCNC: 140 MMOL/L — SIGNIFICANT CHANGE UP (ref 135–145)
WBC # BLD: 6.81 K/UL — SIGNIFICANT CHANGE UP (ref 3.8–10.5)
WBC # FLD AUTO: 6.81 K/UL — SIGNIFICANT CHANGE UP (ref 3.8–10.5)

## 2023-04-21 PROCEDURE — 86900 BLOOD TYPING SEROLOGIC ABO: CPT

## 2023-04-21 PROCEDURE — 36415 COLL VENOUS BLD VENIPUNCTURE: CPT

## 2023-04-21 PROCEDURE — 85027 COMPLETE CBC AUTOMATED: CPT

## 2023-04-21 PROCEDURE — G0463: CPT

## 2023-04-21 PROCEDURE — 80048 BASIC METABOLIC PNL TOTAL CA: CPT

## 2023-04-21 PROCEDURE — 87641 MR-STAPH DNA AMP PROBE: CPT

## 2023-04-21 PROCEDURE — 87640 STAPH A DNA AMP PROBE: CPT

## 2023-04-21 PROCEDURE — 86850 RBC ANTIBODY SCREEN: CPT

## 2023-04-21 PROCEDURE — 86901 BLOOD TYPING SEROLOGIC RH(D): CPT

## 2023-04-21 RX ORDER — CHLORHEXIDINE GLUCONATE 213 G/1000ML
1 SOLUTION TOPICAL ONCE
Refills: 0 | Status: DISCONTINUED | OUTPATIENT
Start: 2023-05-10 | End: 2023-05-11

## 2023-04-21 NOTE — H&P PST ADULT - HISTORY OF PRESENT ILLNESS
58 y/o female with history of GERD presents today for presurgical evaluation.  PMHx includes.....  She is scheduled for robotic assisted hiatal hernia repair with partial fundoplication on 5/10/23.     58 y/o female with history of GERD and hiatal hernia presents today for presurgical evaluation.  PMHx includes dyspepsia, OA.  She reports history of heartburn which has not improved with any medications.  She reports it is worse in AM, belching and vomiting and regurgitation.  She had small frequent meals.  She is scheduled for robotic assisted hiatal hernia repair with partial fundoplication on 5/10/23.     58 y/o female with history of GERD and hiatal hernia presents today for presurgical evaluation.  PMHx includes dyspepsia, OA.  She was treated previously for H. Pylori but reports no improvement in symptoms.  She reports history of heartburn which has not improved with any medication or lifestyle modification.  She reports bloating is worse in the AM causing belching and admits to nausea, vomiting and regurgitation.  She has been eating soft diet with small frequent meals and avoids dietary triggers such as chocolate, sweets and fatty foods.  She is scheduled for robotic assisted hiatal hernia repair with partial fundoplication on 5/10/23.    She denies any recent infections, fever, chills, chest pain, shortness of breath, cough, wheezing, sore throat and change in taste/smell.

## 2023-04-21 NOTE — H&P PST ADULT - PROBLEM SELECTOR PLAN 1
Pt. is scheduled for robot assisted hiatal hernia repair with partial fundoplication on 5/10/23. Pt. is scheduled for robot assisted hiatal hernia repair with partial fundoplication on 5/10/23.  Preop instructions reviewed, pt verbalized understanding.  Preop labs drawn today (CBC, BMP, MRSA and T & S). Pt. is scheduled for robot assisted hiatal hernia repair with partial fundoplication on 5/10/23.  Preop instructions reviewed, pt verbalized understanding.  Preop labs drawn today (CBC, BMP, MRSA and T & S).  Surgeon instructed pt. to see PCP for medical evaluation prior to surgery. (Please check Allscripts for note).

## 2023-04-21 NOTE — H&P PST ADULT - ASSESSMENT
DASI score: 7.04  DASI activity: active, walks, housework, ADL's  Loose teeth or denture: denies  Mallampati: II DASI score: 7.04  DASI activity: active, walks, housework, ADL's  Loose teeth or denture: denies  Mallampati: II    CAPRINI VTE 2.0 SCORE [CLOT updated 2019]    AGE RELATED RISK FACTORS                                                       MOBILITY RELATED FACTORS  [x ] Age 41-60 years                                            (1 Point)                    [ ] Bed rest                                                        (1 Point)  [ ] Age: 61-74 years                                           (2 Points)                  [ ] Plaster cast                                                   (2 Points)  [ ] Age= 75 years                                              (3 Points)                    [ ] Bed bound for more than 72 hours                 (2 Points)    DISEASE RELATED RISK FACTORS                                               GENDER SPECIFIC FACTORS  [ ] Edema in the lower extremities                       (1 Point)              [ ] Pregnancy                                                     (1 Point)  [ ] Varicose veins                                               (1 Point)                     [ ] Post-partum < 6 weeks                                   (1 Point)             [ ] BMI > 25 Kg/m2                                            (1 Point)                     [ ] Hormonal therapy  or oral contraception          (1 Point)                 [ ] Sepsis (in the previous month)                        (1 Point)               [ ] History of pregnancy complications                 (1 point)  [ ] Pneumonia or serious lung disease                                               [ ] Unexplained or recurrent                     (1 Point)           (in the previous month)                               (1 Point)  [ ] Abnormal pulmonary function test                     (1 Point)                 SURGERY RELATED RISK FACTORS  [ ] Acute myocardial infarction                              (1 Point)               [ ]  Section                                             (1 Point)  [ ] Congestive heart failure (in the previous month)  (1 Point)      [ ] Minor surgery                                                  (1 Point)   [ ] Inflammatory bowel disease                             (1 Point)               [ ] Arthroscopic surgery                                        (2 Points)  [ ] Central venous access                                      (2 Points)                [x ] General surgery lasting more than 45 minutes (2 points)  [ ] Malignancy- Present or previous                   (2 Points)                [ ] Elective arthroplasty                                         (5 points)    [ ] Stroke (in the previous month)                          (5 Points)                                                                                                                                                           HEMATOLOGY RELATED FACTORS                                                 TRAUMA RELATED RISK FACTORS  [ ] Prior episodes of VTE                                     (3 Points)                [ ] Fracture of the hip, pelvis, or leg                       (5 Points)  [ ] Positive family history for VTE                         (3 Points)             [ ] Acute spinal cord injury (in the previous month)  (5 Points)  [ ] Prothrombin 74969 A                                     (3 Points)               [ ] Paralysis  (less than 1 month)                             (5 Points)  [ ] Factor V Leiden                                             (3 Points)                  [ ] Multiple Trauma within 1 month                        (5 Points)  [ ] Lupus anticoagulants                                     (3 Points)                                                           [ ] Anticardiolipin antibodies                               (3 Points)                                                       [ ] High homocysteine in the blood                      (3 Points)                                             [ ] Other congenital or acquired thrombophilia      (3 Points)                                                [ ] Heparin induced thrombocytopenia                  (3 Points)                                     Total Score [      3    ]

## 2023-04-21 NOTE — H&P PST ADULT - PRIMARY CARE PROVIDER
Dr. Isaac Jamil [phone] Dr. Isaac Jamil [phone]- instructed to see PCP (by surgeon) for medical clearance- has appt 5/2/23

## 2023-04-21 NOTE — H&P PST ADULT - GASTROINTESTINAL
P[facility] Neurology Clinic  Initial Visit     Patient Name: Stephany Diaz  MRN: [id number]    CC: New onset seizure     HPI  Stephany Diaz is a 9 y.o. year old male with ADHD (on Adderall , prescribed by his pediatrician) and a single seizure.  I last saw him 4/29/20  His mother was present to provide some of the history.      On 4/27 around 8PM, Patient started complaining of seizure spots and double for approximately 5 minutes before he started to stare off into space. During this period he was unresponsive to verbal commands. He was standing during this time. Unsure of how long this lasted. The patient then started sweating profusely. Decision was made to bring the patient to hospital but while in the car patient started to have full body convulsions (unsure how long but only in car and they live 3 minutes from ER). In ED, patient was given 2mg Ativan and loaded with Keppra (2g). Multiple episodes of emesis noted in ED, routine labwork was normal with exception of elevated ALP (302). CT and MRI Brain were wnl. EKG wnl.     Discharged on Keppra, no side effects from medication     No recent fever/cough, sick contacts. Denies any personal or family history of seizures however the patient is adopted and there is no contact with birth parents. Full term normal pregnancy with no complications. Adopted at 2 weeks.     Normal since discharge, no staring spells. Mother notes that the patient has been falling out of bed recently which has not happened before.    Review of Systems   Constitutional: Negative for chills, fever and weight loss.   HENT: Negative for hearing loss and sinus pain.    Eyes: Positive for double vision. Negative for discharge and redness.   Respiratory: Negative for cough and shortness of breath.    Cardiovascular: Negative for chest pain.   Gastrointestinal: Positive for nausea and vomiting. Negative for diarrhea.   Musculoskeletal: Positive for falls.   Neurological: Positive for seizures.  Negative for dizziness, tingling, tremors, sensory change, speech change, focal weakness, weakness and headaches.       Past Medical History  Past Medical History:   Diagnosis Date    ADHD (attention deficit hyperactivity disorder)     Asthma     no hosp    Seizure 4/27/2020       Medications    Current Outpatient Medications:     diazePAM (DIASTAT ACUDIAL) 12.5-15-17.5-20 mg Kit, Place 17.5 mg rectally daily as needed (seizure > 5min)., Disp: 2 each, Rfl: 3    levETIRAcetam (KEPPRA) 500 MG Tab, Take 1 tablet (500 mg total) by mouth 2 (two) times daily., Disp: 60 tablet, Rfl: 4    dextroamphetamine-amphetamine (ADDERALL XR) 15 MG 24 hr capsule, Take 1 capsule (15 mg total) by mouth once daily., Disp: 30 capsule, Rfl: 0  Any other notable medications as documented in HPI    Allergies  Review of patient's allergies indicates:  No Known Allergies    Social History  Social History     Socioeconomic History    Marital status: Single     Spouse name: Not on file    Number of children: Not on file    Years of education: Not on file    Highest education level: Not on file   Occupational History    Not on file   Social Needs    Financial resource strain: Not on file    Food insecurity     Worry: Not on file     Inability: Not on file    Transportation needs     Medical: Not on file     Non-medical: Not on file   Tobacco Use    Smoking status: Never Smoker    Smokeless tobacco: Never Used   Substance and Sexual Activity    Alcohol use: No    Drug use: Not on file    Sexual activity: Not on file   Lifestyle    Physical activity     Days per week: Not on file     Minutes per session: Not on file    Stress: Not on file   Relationships    Social connections     Talks on phone: Not on file     Gets together: Not on file     Attends Gnosticism service: Not on file     Active member of club or organization: Not on file     Attends meetings of clubs or organizations: Not on file     Relationship status: Not on  "file   Other Topics Concern    Not on file   Social History Narrative    Lives with parents and younger brother, JV.  No pets.  In school.     7 week old adopted sister, Kimberley, passed away in 2014.     Any other notable Social History as documented in HPI.    Family History  Family History   Problem Relation Age of Onset    Asthma Mother     Diabetes Mother     Hernia Mother     Asthma Father     Hypertension Maternal Grandmother     Asthma Maternal Grandfather     Cancer Maternal Grandfather     Early death Neg Hx     Heart disease Neg Hx      Any other notable FMH as documented in HPI.    Physical Exam  Ht 5' 5" (1.651 m)   Wt 97.1 kg (214 lb 2.8 oz)   BMI 35.64 kg/m²     Physical Exam   Constitutional: He appears well-developed. He is active. No distress.   HENT:   Head: Normocephalic.   Neck: Normal range of motion.   Pulmonary/Chest: Effort normal.   Musculoskeletal: Normal range of motion.   Neurological: He is alert.   Awake, alert, and oriented for age  Normal speech, appropriate response to questions  EOM intact. No Nystagmus. No ophthalmoplegia.    Facial expression is full and symmetric.   Tongue is midline   Moves all 4 extremities against gravity  Is able to squat, jump and raise arms above the head  No bradykinesia or dysdiadochokinesia.  Normal proprioception on upper extremities   Coordination (Finger to chin) is normal bilaterally.  No appendicular ataxia  Normal gait and balance.   Psychiatric: He has a normal mood and affect. His speech is normal. Thought content normal.        Lab and Test Results  Labs from recent hospitalization reviewed, , otherwise unremarkable     EEG today read by me- normal    EEG 4/27/20- normal    Images:    CT Head 4/27/20  Normal noncontrast CT scan of the brain    MRI Brain Epilepsy 4/27  No significant intracranial abnormality identified, within limitations of mild patient motion artifact    Assessment and Plan  9 year old boy with history of ADHD " presenting for evaluation of first time seizure on 4/27    1 episode of full body convulsions proceeded by 1 hour of seeing spots and double vision with staring spells  CT Head and MRI Brain wnl  Labs normal  EEG 4/28 and today normal   He is on keppra  Discussed risk/benefit of continuing with keppra.  Risk of further seizure off of AED about 30%  Discussed seizure safety (heights and swimming) as well as first aid with mother and patient.   Prescribed diastat for rescure, seizure >5 mins or seizure cluster   Family will continue keppra for now and contact me if they decide to wean off  Follow up in 4 months  Family was instructed to contact either the primary care physician office or our office by telephone if there is any deterioration in his neurologic status, change in presenting symptoms, lack of beneficial response to treatment plan, or signs of adverse effects of current therapies, all of which were reviewed.    Letter sent to PCP  25 min spent with pt face to face with >50% time spent counseling and coordination of care. Discussed diagnosis, prognosis and treatment             details… normal/soft/nontender/nondistended/normal active bowel sounds

## 2023-04-21 NOTE — H&P PST ADULT - NSICDXPASTMEDICALHX_GEN_ALL_CORE_FT
PAST MEDICAL HISTORY:  Gastritis     GERD (gastroesophageal reflux disease)      PAST MEDICAL HISTORY:  2019 novel coronavirus disease (COVID-19)     Dyspepsia     Gastritis     GERD (gastroesophageal reflux disease)     H pylori ulcer

## 2023-04-22 LAB
MRSA PCR RESULT.: SIGNIFICANT CHANGE UP
S AUREUS DNA NOSE QL NAA+PROBE: SIGNIFICANT CHANGE UP

## 2023-05-02 ENCOUNTER — NON-APPOINTMENT (OUTPATIENT)
Age: 58
End: 2023-05-02

## 2023-05-02 ENCOUNTER — APPOINTMENT (OUTPATIENT)
Dept: INTERNAL MEDICINE | Facility: CLINIC | Age: 58
End: 2023-05-02
Payer: COMMERCIAL

## 2023-05-02 VITALS
WEIGHT: 140 LBS | DIASTOLIC BLOOD PRESSURE: 78 MMHG | HEIGHT: 60 IN | TEMPERATURE: 97.3 F | SYSTOLIC BLOOD PRESSURE: 126 MMHG | HEART RATE: 71 BPM | BODY MASS INDEX: 27.48 KG/M2 | OXYGEN SATURATION: 98 %

## 2023-05-02 DIAGNOSIS — Z01.818 ENCOUNTER FOR OTHER PREPROCEDURAL EXAMINATION: ICD-10-CM

## 2023-05-02 PROCEDURE — 99214 OFFICE O/P EST MOD 30 MIN: CPT | Mod: 25

## 2023-05-02 PROCEDURE — 93000 ELECTROCARDIOGRAM COMPLETE: CPT

## 2023-05-02 NOTE — RESULTS/DATA
[] : results reviewed [de-identified] : - NSR @ 65, nml axis, no ST or T wave changes compared to prior

## 2023-05-02 NOTE — PLAN
[FreeTextEntry1] : discussed w pt \par \par reviewed preop testing and EKG \par no medical contraindications to the planned surgery as scheduled \par \par please call with any questions \par \par RTO 6 months for routine physical exam or earlier prn if any new concerns

## 2023-05-02 NOTE — HISTORY OF PRESENT ILLNESS
[No Pertinent Cardiac History] : no history of aortic stenosis, atrial fibrillation, coronary artery disease, recent myocardial infarction, or implantable device/pacemaker [No Pertinent Pulmonary History] : no history of asthma, COPD, sleep apnea, or smoking [No Adverse Anesthesia Reaction] : no adverse anesthesia reaction in self or family member [(Patient denies any chest pain, claudication, dyspnea on exertion, orthopnea, palpitations or syncope)] : Patient denies any chest pain, claudication, dyspnea on exertion, orthopnea, palpitations or syncope [Good (7-10 METs)] : Good (7-10 METs) [Chronic Anticoagulation] : no chronic anticoagulation [Diabetes] : no diabetes [FreeTextEntry1] : - robot-assisted hiatal hernia repair with partial fundoplication  [FreeTextEntry2] : 5/10/23 [FreeTextEntry3] : - Dr Shakeel Herbert - [facility] - fax # [phone]  [FreeTextEntry4] : \kamini presents for medical evaluation prior to planned hiatal hernia repair with partial fundoplication for treatment of recurrent acid reflux symtoms refractory to other treatments. last visit in our office 2-3 years ago. she reports during this time she developed chronic GERD symptoms which are not relieved by multiple treatments including lifestyle changes and rx. she has seen multiple GI  specialists. now scheduled for surgery.\par \par feeling well currently, no new concerns. no recent illnesses, no chronic medical issues. not taking any rx currently. \par no prior surgeries. no hx of bleeding problems, CV disease or thromboembolism. \par \par she reports she had a screening colonoscopy in 2021 with no significant findings \par

## 2023-05-02 NOTE — PHYSICAL EXAM
[No Acute Distress] : no acute distress [Well-Appearing] : well-appearing [Normal Voice/Communication] : normal voice/communication [No Lymphadenopathy] : no lymphadenopathy [Normal] : normal rate, regular rhythm, normal S1 and S2 and no murmur heard [No Carotid Bruits] : no carotid bruits [Pedal Pulses Present] : the pedal pulses are present [No Edema] : there was no peripheral edema [Coordination Grossly Intact] : coordination grossly intact [Normal Gait] : normal gait [Alert and Oriented x3] : oriented to person, place, and time [Speech Grossly Normal] : speech grossly normal [Normal Mood] : the mood was normal

## 2023-05-02 NOTE — REVIEW OF SYSTEMS
[Nausea] : nausea [Heartburn] : heartburn [Negative] : Heme/Lymph [Abdominal Pain] : no abdominal pain [Vomiting] : no vomiting [Melena] : no melena

## 2023-05-04 ENCOUNTER — APPOINTMENT (OUTPATIENT)
Dept: SURGERY | Facility: CLINIC | Age: 58
End: 2023-05-04

## 2023-05-09 ENCOUNTER — TRANSCRIPTION ENCOUNTER (OUTPATIENT)
Age: 58
End: 2023-05-09

## 2023-05-10 ENCOUNTER — APPOINTMENT (OUTPATIENT)
Dept: SURGERY | Facility: HOSPITAL | Age: 58
End: 2023-05-10
Payer: COMMERCIAL

## 2023-05-10 ENCOUNTER — INPATIENT (INPATIENT)
Facility: HOSPITAL | Age: 58
LOS: 0 days | Discharge: HOME | DRG: 328 | End: 2023-05-11
Attending: SURGERY | Admitting: SURGERY
Payer: COMMERCIAL

## 2023-05-10 ENCOUNTER — TRANSCRIPTION ENCOUNTER (OUTPATIENT)
Age: 58
End: 2023-05-10

## 2023-05-10 VITALS
OXYGEN SATURATION: 97 % | TEMPERATURE: 98 F | WEIGHT: 138.89 LBS | DIASTOLIC BLOOD PRESSURE: 77 MMHG | SYSTOLIC BLOOD PRESSURE: 117 MMHG | HEART RATE: 70 BPM | HEIGHT: 60 IN | RESPIRATION RATE: 17 BRPM

## 2023-05-10 DIAGNOSIS — Z98.890 OTHER SPECIFIED POSTPROCEDURAL STATES: Chronic | ICD-10-CM

## 2023-05-10 DIAGNOSIS — K21.9 GASTRO-ESOPHAGEAL REFLUX DISEASE WITHOUT ESOPHAGITIS: ICD-10-CM

## 2023-05-10 DIAGNOSIS — K44.9 DIAPHRAGMATIC HERNIA WITHOUT OBSTRUCTION OR GANGRENE: ICD-10-CM

## 2023-05-10 LAB
BASOPHILS # BLD AUTO: 0.03 K/UL — SIGNIFICANT CHANGE UP (ref 0–0.2)
BASOPHILS NFR BLD AUTO: 0.6 % — SIGNIFICANT CHANGE UP (ref 0–2)
BUN SERPL-MCNC: 13 MG/DL — SIGNIFICANT CHANGE UP (ref 7–23)
CALCIUM SERPL-MCNC: 8.3 MG/DL — LOW (ref 8.4–10.5)
CHLORIDE SERPL-SCNC: 111 MMOL/L — HIGH (ref 96–108)
CO2 SERPL-SCNC: 24 MMOL/L — SIGNIFICANT CHANGE UP (ref 22–31)
CREAT SERPL-MCNC: 0.75 MG/DL — SIGNIFICANT CHANGE UP (ref 0.5–1.3)
EGFR: 92 ML/MIN/1.73M2 — SIGNIFICANT CHANGE UP
EOSINOPHIL # BLD AUTO: 0.15 K/UL — SIGNIFICANT CHANGE UP (ref 0–0.5)
EOSINOPHIL NFR BLD AUTO: 2.9 % — SIGNIFICANT CHANGE UP (ref 0–6)
GLUCOSE SERPL-MCNC: 113 MG/DL — HIGH (ref 70–99)
HCT VFR BLD CALC: 36 % — SIGNIFICANT CHANGE UP (ref 34.5–45)
HGB BLD-MCNC: 11.8 G/DL — SIGNIFICANT CHANGE UP (ref 11.5–15.5)
IMM GRANULOCYTES NFR BLD AUTO: 0.4 % — SIGNIFICANT CHANGE UP (ref 0–0.9)
LYMPHOCYTES # BLD AUTO: 1.53 K/UL — SIGNIFICANT CHANGE UP (ref 1–3.3)
LYMPHOCYTES # BLD AUTO: 29.1 % — SIGNIFICANT CHANGE UP (ref 13–44)
MAGNESIUM SERPL-MCNC: 2.3 MG/DL — SIGNIFICANT CHANGE UP (ref 1.6–2.6)
MCHC RBC-ENTMCNC: 30.3 PG — SIGNIFICANT CHANGE UP (ref 27–34)
MCHC RBC-ENTMCNC: 32.8 GM/DL — SIGNIFICANT CHANGE UP (ref 32–36)
MCV RBC AUTO: 92.3 FL — SIGNIFICANT CHANGE UP (ref 80–100)
MONOCYTES # BLD AUTO: 0.29 K/UL — SIGNIFICANT CHANGE UP (ref 0–0.9)
MONOCYTES NFR BLD AUTO: 5.5 % — SIGNIFICANT CHANGE UP (ref 2–14)
NEUTROPHILS # BLD AUTO: 3.24 K/UL — SIGNIFICANT CHANGE UP (ref 1.8–7.4)
NEUTROPHILS NFR BLD AUTO: 61.5 % — SIGNIFICANT CHANGE UP (ref 43–77)
NRBC # BLD: 0 /100 WBCS — SIGNIFICANT CHANGE UP (ref 0–0)
PHOSPHATE SERPL-MCNC: 3.4 MG/DL — SIGNIFICANT CHANGE UP (ref 2.5–4.5)
PLATELET # BLD AUTO: 161 K/UL — SIGNIFICANT CHANGE UP (ref 150–400)
POTASSIUM SERPL-MCNC: 4 MMOL/L — SIGNIFICANT CHANGE UP (ref 3.5–5.3)
POTASSIUM SERPL-SCNC: 4 MMOL/L — SIGNIFICANT CHANGE UP (ref 3.5–5.3)
RBC # BLD: 3.9 M/UL — SIGNIFICANT CHANGE UP (ref 3.8–5.2)
RBC # FLD: 12.9 % — SIGNIFICANT CHANGE UP (ref 10.3–14.5)
SODIUM SERPL-SCNC: 135 MMOL/L — SIGNIFICANT CHANGE UP (ref 135–145)
WBC # BLD: 5.26 K/UL — SIGNIFICANT CHANGE UP (ref 3.8–10.5)
WBC # FLD AUTO: 5.26 K/UL — SIGNIFICANT CHANGE UP (ref 3.8–10.5)

## 2023-05-10 PROCEDURE — S2900 ROBOTIC SURGICAL SYSTEM: CPT | Mod: NC

## 2023-05-10 PROCEDURE — 43281 LAP PARAESOPHAG HERN REPAIR: CPT

## 2023-05-10 RX ORDER — ACETAMINOPHEN 500 MG
1000 TABLET ORAL ONCE
Refills: 0 | Status: COMPLETED | OUTPATIENT
Start: 2023-05-11 | End: 2023-05-11

## 2023-05-10 RX ORDER — KETOROLAC TROMETHAMINE 30 MG/ML
15 SYRINGE (ML) INJECTION EVERY 6 HOURS
Refills: 0 | Status: COMPLETED | OUTPATIENT
Start: 2023-05-10 | End: 2023-05-11

## 2023-05-10 RX ORDER — ONDANSETRON 8 MG/1
4 TABLET, FILM COATED ORAL ONCE
Refills: 0 | Status: DISCONTINUED | OUTPATIENT
Start: 2023-05-10 | End: 2023-05-10

## 2023-05-10 RX ORDER — SODIUM CHLORIDE 9 MG/ML
3 INJECTION INTRAMUSCULAR; INTRAVENOUS; SUBCUTANEOUS EVERY 8 HOURS
Refills: 0 | Status: DISCONTINUED | OUTPATIENT
Start: 2023-05-10 | End: 2023-05-10

## 2023-05-10 RX ORDER — SODIUM CHLORIDE 9 MG/ML
1000 INJECTION, SOLUTION INTRAVENOUS
Refills: 0 | Status: DISCONTINUED | OUTPATIENT
Start: 2023-05-10 | End: 2023-05-11

## 2023-05-10 RX ORDER — ACETAMINOPHEN 500 MG
1000 TABLET ORAL ONCE
Refills: 0 | Status: COMPLETED | OUTPATIENT
Start: 2023-05-10 | End: 2023-05-10

## 2023-05-10 RX ORDER — LIDOCAINE HCL 20 MG/ML
0.2 VIAL (ML) INJECTION ONCE
Refills: 0 | Status: DISCONTINUED | OUTPATIENT
Start: 2023-05-10 | End: 2023-05-10

## 2023-05-10 RX ORDER — CEFAZOLIN SODIUM 1 G
2000 VIAL (EA) INJECTION ONCE
Refills: 0 | Status: COMPLETED | OUTPATIENT
Start: 2023-05-10 | End: 2023-05-10

## 2023-05-10 RX ORDER — PANTOPRAZOLE SODIUM 20 MG/1
40 TABLET, DELAYED RELEASE ORAL EVERY 24 HOURS
Refills: 0 | Status: DISCONTINUED | OUTPATIENT
Start: 2023-05-10 | End: 2023-05-11

## 2023-05-10 RX ORDER — METOCLOPRAMIDE HCL 10 MG
10 TABLET ORAL EVERY 6 HOURS
Refills: 0 | Status: DISCONTINUED | OUTPATIENT
Start: 2023-05-10 | End: 2023-05-11

## 2023-05-10 RX ORDER — FENTANYL CITRATE 50 UG/ML
25 INJECTION INTRAVENOUS
Refills: 0 | Status: DISCONTINUED | OUTPATIENT
Start: 2023-05-10 | End: 2023-05-10

## 2023-05-10 RX ORDER — HEPARIN SODIUM 5000 [USP'U]/ML
5000 INJECTION INTRAVENOUS; SUBCUTANEOUS EVERY 8 HOURS
Refills: 0 | Status: DISCONTINUED | OUTPATIENT
Start: 2023-05-10 | End: 2023-05-11

## 2023-05-10 RX ORDER — ONDANSETRON 8 MG/1
4 TABLET, FILM COATED ORAL EVERY 6 HOURS
Refills: 0 | Status: DISCONTINUED | OUTPATIENT
Start: 2023-05-10 | End: 2023-05-11

## 2023-05-10 RX ADMIN — SODIUM CHLORIDE 75 MILLILITER(S): 9 INJECTION, SOLUTION INTRAVENOUS at 21:15

## 2023-05-10 RX ADMIN — FENTANYL CITRATE 25 MICROGRAM(S): 50 INJECTION INTRAVENOUS at 10:15

## 2023-05-10 RX ADMIN — FENTANYL CITRATE 25 MICROGRAM(S): 50 INJECTION INTRAVENOUS at 10:08

## 2023-05-10 RX ADMIN — ONDANSETRON 4 MILLIGRAM(S): 8 TABLET, FILM COATED ORAL at 11:23

## 2023-05-10 RX ADMIN — ONDANSETRON 4 MILLIGRAM(S): 8 TABLET, FILM COATED ORAL at 18:41

## 2023-05-10 RX ADMIN — HEPARIN SODIUM 5000 UNIT(S): 5000 INJECTION INTRAVENOUS; SUBCUTANEOUS at 13:42

## 2023-05-10 RX ADMIN — Medication 15 MILLIGRAM(S): at 18:41

## 2023-05-10 RX ADMIN — Medication 15 MILLIGRAM(S): at 23:50

## 2023-05-10 RX ADMIN — Medication 400 MILLIGRAM(S): at 21:15

## 2023-05-10 RX ADMIN — HEPARIN SODIUM 5000 UNIT(S): 5000 INJECTION INTRAVENOUS; SUBCUTANEOUS at 21:15

## 2023-05-10 RX ADMIN — Medication 400 MILLIGRAM(S): at 16:14

## 2023-05-10 RX ADMIN — Medication 1000 MILLIGRAM(S): at 21:45

## 2023-05-10 RX ADMIN — ONDANSETRON 4 MILLIGRAM(S): 8 TABLET, FILM COATED ORAL at 23:09

## 2023-05-10 RX ADMIN — Medication 15 MILLIGRAM(S): at 23:09

## 2023-05-10 RX ADMIN — PANTOPRAZOLE SODIUM 40 MILLIGRAM(S): 20 TABLET, DELAYED RELEASE ORAL at 11:23

## 2023-05-10 NOTE — CHART NOTE - NSCHARTNOTEFT_GEN_A_CORE
POST-OPERATIVE NOTE    Subjective:  Patient is s/p RA repair of sliding hiatal hernia w/ Toupet fundoplication. Recovering appropriately. Pt denies any SOB/CP/n/v and pain is well controlled.    Vital Signs Last 24 Hrs  T(C): 36.2 (10 May 2023 14:00), Max: 36.6 (10 May 2023 05:54)  T(F): 97.2 (10 May 2023 14:00), Max: 97.9 (10 May 2023 05:54)  HR: 72 (10 May 2023 14:00) (58 - 72)  BP: 139/67 (10 May 2023 14:00) (117/77 - 149/70)  BP(mean): 96 (10 May 2023 14:00) (96 - 103)  RR: 18 (10 May 2023 14:00) (14 - 18)  SpO2: 99% (10 May 2023 14:00) (97% - 100%)    Parameters below as of 10 May 2023 14:00  Patient On (Oxygen Delivery Method): room air      I&O's Detail    10 May 2023 07:01  -  10 May 2023 15:04  --------------------------------------------------------  IN:    Lactated Ringers: 225 mL  Total IN: 225 mL    OUT:    Voided (mL): 850 mL  Total OUT: 850 mL    Total NET: -625 mL        heparin   Injectable 5000    PAST MEDICAL & SURGICAL HISTORY:  GERD (gastroesophageal reflux disease)      Gastritis      H pylori ulcer      Dyspepsia      2019 novel coronavirus disease (COVID-19)      H/O endoscopy      H/O colonoscopy            Physical Exam:  General: NAD, resting comfortably in bed  Pulmonary: Nonlabored breathing, no respiratory distress  Abdominal: soft, minimally distended, minimally TTP near incisions; 5 port sites w/ steri strips c/d/i  Extremities: WWP      LABS:                        11.8   5.26  )-----------( 161      ( 10 May 2023 10:26 )             36.0     05-10    135  |  111<H>  |  13  ----------------------------<  113<H>  4.0   |  24  |  0.75    Ca    8.3<L>      10 May 2023 10:26  Phos  3.4     05-10  Mg     2.3     05-10        CAPILLARY BLOOD GLUCOSE          Radiology and Additional Studies:    Assessment:  The patient is a 57y Female who is now several hours post-op from a s/p RA repair of sliding hiatal hernia w/ Toupet fundoplication.     Plan:  - CLD, bariatric   - LR 75cc  - Pain control as needed  - SQH ppx  - OOB and ambulating as tolerated  - F/u AM labs    [facility]   [phone] POST-OPERATIVE NOTE    Subjective:  Patient is s/p RA repair of sliding hiatal hernia w/ Toupet fundoplication. Recovering appropriately. Pt denies any SOB/CP/n/v and pain is well controlled.    Vital Signs Last 24 Hrs  T(C): 36.2 (10 May 2023 14:00), Max: 36.6 (10 May 2023 05:54)  T(F): 97.2 (10 May 2023 14:00), Max: 97.9 (10 May 2023 05:54)  HR: 72 (10 May 2023 14:00) (58 - 72)  BP: 139/67 (10 May 2023 14:00) (117/77 - 149/70)  BP(mean): 96 (10 May 2023 14:00) (96 - 103)  RR: 18 (10 May 2023 14:00) (14 - 18)  SpO2: 99% (10 May 2023 14:00) (97% - 100%)    Parameters below as of 10 May 2023 14:00  Patient On (Oxygen Delivery Method): room air      I&O's Detail    10 May 2023 07:01  -  10 May 2023 15:04  --------------------------------------------------------  IN:    Lactated Ringers: 225 mL  Total IN: 225 mL    OUT:    Voided (mL): 850 mL  Total OUT: 850 mL    Total NET: -625 mL        heparin   Injectable 5000    PAST MEDICAL & SURGICAL HISTORY:  GERD (gastroesophageal reflux disease)      Gastritis      H pylori ulcer      Dyspepsia      2019 novel coronavirus disease (COVID-19)      H/O endoscopy      H/O colonoscopy            Physical Exam:  General: NAD, resting comfortably in bed  Pulmonary: Nonlabored breathing, no respiratory distress  Abdominal: soft, minimally distended, minimally TTP near incisions; 5 port sites w/ steri strips c/d/i  Extremities: WWP      LABS:                        11.8   5.26  )-----------( 161      ( 10 May 2023 10:26 )             36.0     05-10    135  |  111<H>  |  13  ----------------------------<  113<H>  4.0   |  24  |  0.75    Ca    8.3<L>      10 May 2023 10:26  Phos  3.4     05-10  Mg     2.3     05-10        CAPILLARY BLOOD GLUCOSE          Radiology and Additional Studies:    Assessment:  The patient is a 57y Female who is now several hours post-op from a s/p RA repair of sliding hiatal hernia w/ Toupet fundoplication.     Plan:  - CLD, bariatric   - LR 75cc; can IVL if yuli PO  - Pain control as needed  - SQH ppx  - OOB and ambulating as tolerated  - F/u AM labs    [facility]   [phone]

## 2023-05-10 NOTE — BRIEF OPERATIVE NOTE - NSICDXBRIEFPREOP_GEN_ALL_CORE_FT
PRE-OP DIAGNOSIS:  Hiatal hernia 10-May-2023 09:48:51  Wilton Scott  GERD (gastroesophageal reflux disease) 10-May-2023 09:49:01  Wilton Scott

## 2023-05-10 NOTE — DISCHARGE NOTE PROVIDER - DISCHARGE DIET
Bariatric Diet Phase 1 (Liquids) Bariatric Diet Phase 2 (Puree) Pureed Diet/Bariatric Diet Phase 2 (Puree)

## 2023-05-10 NOTE — BRIEF OPERATIVE NOTE - NSICDXBRIEFPROCEDURE_GEN_ALL_CORE_FT
PROCEDURES:  Robot-assisted repair of sliding hiatal hernia 10-May-2023 09:48:36  Wilton Scott  Robot-assisted Toupet fundoplication 10-May-2023 09:48:44  Wilton Scott

## 2023-05-10 NOTE — BRIEF OPERATIVE NOTE - OPERATION/FINDINGS
Moderate-sized hiatal hernia appreciated. Circumferential esophageal dissection performed for adequate mobilization. Anterior/posterior vagus nerves identified/preserved. Posterior crural stitches placed over 40 Fr ViSiGi. Toupet fundoplication performed after fundal mobilization of short gastrics with posterior gastropexy. No twisting or kinking of wrap or esophagus; everything in good position. Hemostasis achieved. Please see operative report for further details.

## 2023-05-10 NOTE — DISCHARGE NOTE PROVIDER - NSDCCPCAREPLAN_GEN_ALL_CORE_FT
PRINCIPAL DISCHARGE DIAGNOSIS  Diagnosis: GERD (gastroesophageal reflux disease)  Assessment and Plan of Treatment: WOUND CARE: Remove outter bandaids tomorrow POD#2. You may shower. Do not scrub incision. Pat Dry abdomen. Leave the white steri strips in place, they will fall off on their own in approximately 5-7 days.   BATHING: Please do not submerge wound underwater. You may shower and/or sponge bathe.  ACTIVITY: No heavy lifting anything more than 10-15lbs or straining. Otherwise, you may return to your usual level of physical activity. If you are taking narcotic pain medication (such as Percocet), do NOT drive a car, operate machinery or make important decisions.  NOTIFY YOUR SURGEON IF: You have any bleeding that does not stop, any pus draining from your wound, any fever (over 100.4 F) or chills, persistent nausea/vomiting with inability to tolerate food or liquids, persistent diarrhea, or if your pain is not controlled on your discharge pain medications.  FOLLOW-UP:  1. Please call to make a follow-up appointment within one week of discharge with Dr. Herbert.  2. Please follow up with your primary care physician in one week regarding your hospitalization.     PRINCIPAL DISCHARGE DIAGNOSIS  Diagnosis: GERD (gastroesophageal reflux disease)  Assessment and Plan of Treatment: WOUND CARE: Remove outter bandaids tomorrow POD#2. You may shower. Do not scrub incision. Pat Dry abdomen. Leave the white steri strips in place, they will fall off on their own in approximately 5-7 days.   BATHING: Please do not submerge wound underwater. You may shower and/or sponge bathe.  DIET: pureed diet for 1 week then progress to soft/bite-sized diet.  ACTIVITY: No heavy lifting anything more than 10-15lbs or straining. Otherwise, you may return to your usual level of physical activity. If you are taking narcotic pain medication (such as Percocet), do NOT drive a car, operate machinery or make important decisions.  NOTIFY YOUR SURGEON IF: You have any bleeding that does not stop, any pus draining from your wound, any fever (over 100.4 F) or chills, persistent nausea/vomiting with inability to tolerate food or liquids, persistent diarrhea, or if your pain is not controlled on your discharge pain medications.  FOLLOW-UP:  1. Please call to make a follow-up appointment within one week of discharge with Dr. Herbert.  2. Please follow up with your primary care physician in one week regarding your hospitalization.     PRINCIPAL DISCHARGE DIAGNOSIS  Diagnosis: GERD (gastroesophageal reflux disease)  Assessment and Plan of Treatment: WOUND CARE: Remove outer bandaids tomorrow POD#2. You may shower. Do not scrub incision. Pat Dry abdomen. Leave the white steri strips in place, they will fall off on their own in approximately 5-7 days.   BATHING: Please do not submerge wound underwater. You may shower and/or sponge bathe.  DIET: pureed diet for 1 week then progress to soft/bite-sized diet., No bread, carbonation or raw vegetables.  ACTIVITY: No heavy lifting anything more than 10-15lbs or straining. Otherwise, you may return to your usual level of physical activity. If you are taking narcotic pain medication (such as Percocet), do NOT drive a car, operate machinery or make important decisions.  NOTIFY YOUR SURGEON IF: You have any bleeding that does not stop, any pus draining from your wound, any fever (over 100.4 F) or chills, persistent nausea/vomiting with inability to tolerate food or liquids, persistent diarrhea, or if your pain is not controlled on your discharge pain medications.  FOLLOW-UP:  1. Please call to make a follow-up appointment within one to two weeks of discharge with Dr. Herbert.  2. Please follow up with your primary care physician in one week regarding your hospitalization.

## 2023-05-10 NOTE — DISCHARGE NOTE PROVIDER - HOSPITAL COURSE
58 y/o female with history of GERD and hiatal hernia presents today for presurgical evaluation.  PMHx includes dyspepsia, OA.  She was treated previously for H. Pylori but reports no improvement in symptoms.  She reports history of heartburn which has not improved with any medication or lifestyle modification.  She reports bloating is worse in the AM causing belching and admits to nausea, vomiting and regurgitation.  She has been eating soft diet with small frequent meals and avoids dietary triggers such as chocolate, sweets and fatty foods.  She is scheduled for robotic assisted hiatal hernia repair with partial fundoplication on 5/10/23.    She denies any recent infections, fever, chills, chest pain, shortness of breath, cough, wheezing, sore throat and change in taste/smell. On 5/10 Patient is s/p RA repair of sliding hiatal hernia w/ Toupet fundoplication. Recovering appropriately. Diet adv to aundrea clears and tolerated. At the time of discharge, the patient was hemodynamically stable, was tolerating PO diet, was voiding urine and passing stool, was ambulating, and was comfortable with adequate pain control. The patient was instructed to follow up with Dr. Herbert within 1-2 weeks after discharge from the hospital. The patient/family felt comfortable with discharge. The patient was discharged to home. The patient had no other issues.    56 y/o female with history of GERD and hiatal hernia presents today for presurgical evaluation.  PMHx includes dyspepsia, OA.  She was treated previously for H. Pylori but reports no improvement in symptoms.  She reports history of heartburn which has not improved with any medication or lifestyle modification.  She reports bloating is worse in the AM causing belching and admits to nausea, vomiting and regurgitation.  She has been eating soft diet with small frequent meals and avoids dietary triggers such as chocolate, sweets and fatty foods.  She is scheduled for robotic assisted hiatal hernia repair with partial fundoplication on 5/10/23.    She denies any recent infections, fever, chills, chest pain, shortness of breath, cough, wheezing, sore throat and change in taste/smell. On 5/10 Patient is s/p RA repair of sliding hiatal hernia w/ Toupet fundoplication. Recovering appropriately. Diet adv to aundrea clears and tolerated. She was out of bed ambulating. At the time of discharge, the patient was hemodynamically stable, was tolerating PO diet, was voiding urine and passing stool, was ambulating, and was comfortable with adequate pain control. The patient was instructed to follow up with Dr. Herbert within 1-2 weeks after discharge from the hospital. The patient/family felt comfortable with discharge. The patient was discharged to home. The patient had no other issues.

## 2023-05-10 NOTE — BRIEF OPERATIVE NOTE - NSICDXBRIEFPOSTOP_GEN_ALL_CORE_FT
POST-OP DIAGNOSIS:  Hiatal hernia 10-May-2023 09:49:11  Wilton Scott  GERD (gastroesophageal reflux disease) 10-May-2023 09:49:16  Wilton Scott

## 2023-05-10 NOTE — PATIENT PROFILE ADULT - FALL HARM RISK - UNIVERSAL INTERVENTIONS
Bed in lowest position, wheels locked, appropriate side rails in place/Call bell, personal items and telephone in reach/Instruct patient to call for assistance before getting out of bed or chair/Non-slip footwear when patient is out of bed/[place] to call system/Physically safe environment - no spills, clutter or unnecessary equipment/Purposeful Proactive Rounding/Room/bathroom lighting operational, light cord in reach

## 2023-05-10 NOTE — PATIENT PROFILE ADULT - LEGAL HELP
1).  Continue to check blood sugar before meals.  Bonus:  before bed and 2  hours after start of meal on occasion  2).  Continue metformin xr 4 tablets daily and lantus insulin 8 units    If waking up with blood sugars 100 or less in the morning, look to start to decrease your insulin more by 1-2 units    If any blood sugars less than 70, treat low blood sugar and reduce your insulin    3).  30-60 grams of carbohydrate per meal (occasionally 75 grams of carbohydrate); 15-30 grams of carbohydrate per snack    4).  Make future appointment with your Doctor for Diabetes visit in June       no

## 2023-05-10 NOTE — PRE-ANESTHESIA EVALUATION ADULT - NSANTHPMHFT_GEN_ALL_CORE
57F hx GERD, gastritis, osteoarthritis presenting for robot assisted hiatal hernia repair. Denies chest pain, SOB; espouses good functional status. Plan for general anesthesia with TIVA.

## 2023-05-10 NOTE — DISCHARGE NOTE PROVIDER - CARE PROVIDER_API CALL
Shakeel Herbert)  Surgery  [address] [place] [id number]  Phone: [phone]  Fax: [phone]  Follow Up Time:

## 2023-05-10 NOTE — DISCHARGE NOTE PROVIDER - NSDCMRMEDTOKEN_GEN_ALL_CORE_FT
Advil 200 mg oral tablet: 1 orally every 8 hours   acetaminophen 325 mg oral tablet: 3 orally every 6 hours as needed for  pain take as needed for pain   acetaminophen 500 mg/15 mL oral liquid: 5 milliliter(s) orally every 6 hours

## 2023-05-10 NOTE — DISCHARGE NOTE PROVIDER - NSDCFUADDINST_GEN_ALL_CORE_FT
MEDICATIONS: You may swallow pills if they are the size of an M&M or smaller, if they are larger- crush or take chewable form. For capsules, please open and sprinkle into apple sauce.  MEDICATIONS: You may swallow pills if they are the size of an M&M or smaller, if they are larger- crush or take chewable form. For capsules, please open and sprinkle into apple sauce. Pureed diet for 1 week then progress to soft/bite-sized  MEDICATIONS: You may swallow pills if they are the size of an M&M or smaller, if they are larger- crush or take chewable form. For capsules, please open and sprinkle into apple sauce. pureed diet for 1 week then progress to soft/bite-sized diet., No bread, carbonation or raw vegetables.

## 2023-05-11 ENCOUNTER — TRANSCRIPTION ENCOUNTER (OUTPATIENT)
Age: 58
End: 2023-05-11

## 2023-05-11 VITALS
SYSTOLIC BLOOD PRESSURE: 123 MMHG | RESPIRATION RATE: 18 BRPM | OXYGEN SATURATION: 96 % | DIASTOLIC BLOOD PRESSURE: 64 MMHG | HEART RATE: 61 BPM | TEMPERATURE: 99 F

## 2023-05-11 LAB
ANION GAP SERPL CALC-SCNC: 13 MMOL/L — SIGNIFICANT CHANGE UP (ref 5–17)
BASOPHILS # BLD AUTO: 0.01 K/UL — SIGNIFICANT CHANGE UP (ref 0–0.2)
BASOPHILS NFR BLD AUTO: 0.1 % — SIGNIFICANT CHANGE UP (ref 0–2)
BUN SERPL-MCNC: 8 MG/DL — SIGNIFICANT CHANGE UP (ref 7–23)
CALCIUM SERPL-MCNC: 8.9 MG/DL — SIGNIFICANT CHANGE UP (ref 8.4–10.5)
CHLORIDE SERPL-SCNC: 105 MMOL/L — SIGNIFICANT CHANGE UP (ref 96–108)
CO2 SERPL-SCNC: 22 MMOL/L — SIGNIFICANT CHANGE UP (ref 22–31)
CREAT SERPL-MCNC: 0.64 MG/DL — SIGNIFICANT CHANGE UP (ref 0.5–1.3)
EGFR: 102 ML/MIN/1.73M2 — SIGNIFICANT CHANGE UP
EOSINOPHIL # BLD AUTO: 0 K/UL — SIGNIFICANT CHANGE UP (ref 0–0.5)
EOSINOPHIL NFR BLD AUTO: 0 % — SIGNIFICANT CHANGE UP (ref 0–6)
GLUCOSE SERPL-MCNC: 86 MG/DL — SIGNIFICANT CHANGE UP (ref 70–99)
HCT VFR BLD CALC: 37.5 % — SIGNIFICANT CHANGE UP (ref 34.5–45)
HGB BLD-MCNC: 12.3 G/DL — SIGNIFICANT CHANGE UP (ref 11.5–15.5)
IMM GRANULOCYTES NFR BLD AUTO: 0.6 % — SIGNIFICANT CHANGE UP (ref 0–0.9)
LYMPHOCYTES # BLD AUTO: 1.55 K/UL — SIGNIFICANT CHANGE UP (ref 1–3.3)
LYMPHOCYTES # BLD AUTO: 14.9 % — SIGNIFICANT CHANGE UP (ref 13–44)
MAGNESIUM SERPL-MCNC: 2.1 MG/DL — SIGNIFICANT CHANGE UP (ref 1.6–2.6)
MCHC RBC-ENTMCNC: 30.1 PG — SIGNIFICANT CHANGE UP (ref 27–34)
MCHC RBC-ENTMCNC: 32.8 GM/DL — SIGNIFICANT CHANGE UP (ref 32–36)
MCV RBC AUTO: 91.9 FL — SIGNIFICANT CHANGE UP (ref 80–100)
MONOCYTES # BLD AUTO: 0.59 K/UL — SIGNIFICANT CHANGE UP (ref 0–0.9)
MONOCYTES NFR BLD AUTO: 5.7 % — SIGNIFICANT CHANGE UP (ref 2–14)
NEUTROPHILS # BLD AUTO: 8.21 K/UL — HIGH (ref 1.8–7.4)
NEUTROPHILS NFR BLD AUTO: 78.7 % — HIGH (ref 43–77)
NRBC # BLD: 0 /100 WBCS — SIGNIFICANT CHANGE UP (ref 0–0)
PHOSPHATE SERPL-MCNC: 3.4 MG/DL — SIGNIFICANT CHANGE UP (ref 2.5–4.5)
PLATELET # BLD AUTO: 173 K/UL — SIGNIFICANT CHANGE UP (ref 150–400)
POTASSIUM SERPL-MCNC: 4 MMOL/L — SIGNIFICANT CHANGE UP (ref 3.5–5.3)
POTASSIUM SERPL-SCNC: 4 MMOL/L — SIGNIFICANT CHANGE UP (ref 3.5–5.3)
RBC # BLD: 4.08 M/UL — SIGNIFICANT CHANGE UP (ref 3.8–5.2)
RBC # FLD: 12.9 % — SIGNIFICANT CHANGE UP (ref 10.3–14.5)
SODIUM SERPL-SCNC: 140 MMOL/L — SIGNIFICANT CHANGE UP (ref 135–145)
WBC # BLD: 10.42 K/UL — SIGNIFICANT CHANGE UP (ref 3.8–10.5)
WBC # FLD AUTO: 10.42 K/UL — SIGNIFICANT CHANGE UP (ref 3.8–10.5)

## 2023-05-11 PROCEDURE — 86900 BLOOD TYPING SEROLOGIC ABO: CPT

## 2023-05-11 PROCEDURE — 84100 ASSAY OF PHOSPHORUS: CPT

## 2023-05-11 PROCEDURE — 86901 BLOOD TYPING SEROLOGIC RH(D): CPT

## 2023-05-11 PROCEDURE — 36415 COLL VENOUS BLD VENIPUNCTURE: CPT

## 2023-05-11 PROCEDURE — 85025 COMPLETE CBC W/AUTO DIFF WBC: CPT

## 2023-05-11 PROCEDURE — 80048 BASIC METABOLIC PNL TOTAL CA: CPT

## 2023-05-11 PROCEDURE — 83735 ASSAY OF MAGNESIUM: CPT

## 2023-05-11 PROCEDURE — S2900: CPT

## 2023-05-11 PROCEDURE — 86850 RBC ANTIBODY SCREEN: CPT

## 2023-05-11 PROCEDURE — C9399: CPT

## 2023-05-11 RX ORDER — IBUPROFEN 200 MG
1 TABLET ORAL
Refills: 0 | DISCHARGE

## 2023-05-11 RX ORDER — ACETAMINOPHEN 500 MG
3 TABLET ORAL
Qty: 0 | Refills: 0 | DISCHARGE

## 2023-05-11 RX ORDER — ACETAMINOPHEN 500 MG
5 TABLET ORAL
Qty: 300 | Refills: 0
Start: 2023-05-11

## 2023-05-11 RX ADMIN — Medication 15 MILLIGRAM(S): at 05:46

## 2023-05-11 RX ADMIN — ONDANSETRON 4 MILLIGRAM(S): 8 TABLET, FILM COATED ORAL at 05:16

## 2023-05-11 RX ADMIN — Medication 15 MILLIGRAM(S): at 05:16

## 2023-05-11 RX ADMIN — Medication 1000 MILLIGRAM(S): at 04:00

## 2023-05-11 RX ADMIN — Medication 400 MILLIGRAM(S): at 03:35

## 2023-05-11 RX ADMIN — HEPARIN SODIUM 5000 UNIT(S): 5000 INJECTION INTRAVENOUS; SUBCUTANEOUS at 05:16

## 2023-05-11 NOTE — PROGRESS NOTE ADULT - ATTENDING COMMENTS
I saw and examined the patient, and reviewed  the history and data with the patient and staff  Agree with note which was also reviewed and edited where appropriate.  D/W patient, RN, residents and Fellow    POD#1, s/p RA-HH repair with toupet fundoplication doing well.  d/c when tolerating enough po.  home on soft and mushy.  No bread, carbonation or raw vegetables.  f/u 2 weeks

## 2023-05-11 NOTE — DISCHARGE NOTE NURSING/CASE MANAGEMENT/SOCIAL WORK - NSDCPEFALRISK_GEN_ALL_CORE
For information on Fall & Injury Prevention, visit: [email or web address] OR  [email or web address] OR  https://www.cdc.gov/steadi/patient.html

## 2023-05-11 NOTE — PROGRESS NOTE ADULT - SUBJECTIVE AND OBJECTIVE BOX
Surgery Progress Note    Overnight Events: No acute events overnight.  SUBJECTIVE: Patient seen and examined at bedside with surgical team, patient without complaints. Denies fever, chills, CP, SOB nausea, vomiting, dizziness.      OBJECTIVE:    Vital Signs Last 24 Hrs  T(C): 36.9 (11 May 2023 00:29), Max: 37.1 (10 May 2023 18:40)  T(F): 98.4 (11 May 2023 00:29), Max: 98.7 (10 May 2023 18:40)  HR: 71 (11 May 2023 00:29) (58 - 77)  BP: 126/67 (11 May 2023 00:29) (117/77 - 151/82)  BP(mean): 96 (10 May 2023 14:00) (96 - 103)  RR: 18 (11 May 2023 00:29) (14 - 18)  SpO2: 97% (11 May 2023 00:29) (95% - 100%)    Parameters below as of 11 May 2023 00:29  Patient On (Oxygen Delivery Method): room air    I&O's Detail    10 May 2023 07:01  -  11 May 2023 01:06  --------------------------------------------------------  IN:    Lactated Ringers: 225 mL  Total IN: 225 mL    OUT:    Voided (mL): 1850 mL  Total OUT: 1850 mL    Total NET: -1625 mL      MEDICATIONS  (STANDING):  acetaminophen   IVPB .. 1000 milliGRAM(s) IV Intermittent once  acetaminophen   IVPB .. 1000 milliGRAM(s) IV Intermittent once  chlorhexidine 2% Cloths 1 Application(s) Topical once  heparin   Injectable 5000 Unit(s) SubCutaneous every 8 hours  ketorolac   Injectable 15 milliGRAM(s) IV Push every 6 hours  lactated ringers. 1000 milliLiter(s) (75 mL/Hr) IV Continuous <Continuous>  ondansetron Injectable 4 milliGRAM(s) IV Push every 6 hours  pantoprazole  Injectable 40 milliGRAM(s) IV Push every 24 hours    MEDICATIONS  (PRN):  metoclopramide Injectable 10 milliGRAM(s) IV Push every 6 hours PRN nausea      PHYSICAL EXAM:  Constitutional: A&Ox3, NAD  Respiratory: Unlabored breathing  Abdomen: Soft, mildly distended, ATTP. No rebound or guarding. incisions c/d/i  Extremities: WWP, BELL spontaneously    LABS:                        11.8   5.26  )-----------( 161      ( 10 May 2023 10:26 )             36.0     05-10    135  |  111<H>  |  13  ----------------------------<  113<H>  4.0   |  24  |  0.75    Ca    8.3<L>      10 May 2023 10:26  Phos  3.4     05-10  Mg     2.3     05-10              IMAGING:

## 2023-05-11 NOTE — DISCHARGE NOTE NURSING/CASE MANAGEMENT/SOCIAL WORK - PATIENT PORTAL LINK FT
You can access the FollowMyHealth Patient Portal offered by [facility] by registering at the following website: [email or web address]. By joining [employer]’s FollowMyHealth portal, you will also be able to view your health information using other applications (apps) compatible with our system.

## 2023-05-11 NOTE — PROGRESS NOTE ADULT - ASSESSMENT
{\rtf1\qqzquo84366\ansi\sujqrif8117\ftnbj\uc1\deff0  {\fonttbl{\f0 \fnil Segoe UI;}{\f1 \fnil \fcharset0 Segoe UI;}{\f2 \fnil Times [place];}}  {\colortbl ;\zzd930\glfqk308\iytp200 ;\red0\green0\blue0 ;\red0\green0\sxkk889 ;\red0\green0\blue0 ;}  {\stylesheet{\f0\fs20 Normal;}{\cs1 Default Paragraph Font;}{\cs2\f0\fs16 Line Number;}{\cs3\f2\fs24\ul\cf3 Hyperlink;}}  {\*\revtbl{Unknown;}}  \cxluek26118\wzbcwe36118\ehugc2590\hndzq6756\ayoue6405\zvhxw4571\cnuqeyw234\hfbpcsd485\nogrowautofit\etjohu347\formshade\nofeaturethrottle1\dntblnsbdb\fet4\aendnotes\aftnnrlc\pgbrdrhead\pgbrdrfoot  \sectd\ukdgkm56995\uvdbpk64185\guttersxn0\kgsdkvpy5244\tcltydhg3439\eegjhjcz4366\owhkemoy3072\mcitzqs585\slvcglv134\sbkpage\pgncont\pgndec  \plain\plain\f0\fs24\ql\plain\f0\fs24\plain\f0\fs20\qbwb7977\hich\f0\dbch\f0\loch\f0\fs20 The patient is a 57y Female post-op from a s/p RA repair of sliding hiatal hernia w/ Toupet fundoplication. \par  \par  Plan:\par  - CLD, bariatric \par  - LR 75cc; can IVL if yuli PO\par  - Pain control as needed\par  - SQH ppx\par  - OOB and ambulating as tolerated\par  - F/u AM labs\par  \par  [facility] \par  [id number].\plain\f1\fs20\zmgi0511\hich\f1\dbch\f1\loch\f1\cf2\fs20\strike\plain\f1\fs20\njmo1375\hich\f1\dbch\f1\loch\f1\cf2\fs20\plain\f0\fs20\qfvu5024\hich\f0\dbch\f0\loch\f0\fs20\par  }

## 2023-05-12 PROBLEM — K21.9 GASTRO-ESOPHAGEAL REFLUX DISEASE WITHOUT ESOPHAGITIS: Chronic | Status: ACTIVE | Noted: 2023-04-21

## 2023-05-12 PROBLEM — K27.9 PEPTIC ULCER, SITE UNSPECIFIED, UNSPECIFIED AS ACUTE OR CHRONIC, WITHOUT HEMORRHAGE OR PERFORATION: Chronic | Status: ACTIVE | Noted: 2023-04-21

## 2023-05-12 PROBLEM — K29.70 GASTRITIS, UNSPECIFIED, WITHOUT BLEEDING: Chronic | Status: ACTIVE | Noted: 2023-04-21

## 2023-05-12 PROBLEM — R10.13 EPIGASTRIC PAIN: Chronic | Status: ACTIVE | Noted: 2023-04-21

## 2023-05-12 PROBLEM — U07.1 COVID-19: Chronic | Status: ACTIVE | Noted: 2023-04-21

## 2023-05-25 ENCOUNTER — APPOINTMENT (OUTPATIENT)
Dept: SURGERY | Facility: CLINIC | Age: 58
End: 2023-05-25
Payer: COMMERCIAL

## 2023-05-25 VITALS
SYSTOLIC BLOOD PRESSURE: 115 MMHG | TEMPERATURE: 97.2 F | RESPIRATION RATE: 17 BRPM | OXYGEN SATURATION: 97 % | WEIGHT: 132.31 LBS | HEIGHT: 60 IN | DIASTOLIC BLOOD PRESSURE: 65 MMHG | HEART RATE: 76 BPM | BODY MASS INDEX: 25.97 KG/M2

## 2023-05-25 PROCEDURE — 99024 POSTOP FOLLOW-UP VISIT: CPT

## 2023-05-25 NOTE — HISTORY OF PRESENT ILLNESS
[de-identified] : Ms. AUDREY DILLON is a 57 year-old woman who presented with GERD & dyspepsia s/p robot-assisted laparoscopic hiatal hernia repair with Toupet fundoplication on 5/10/23, who comes in today for a post op visit. She is doing well. Denies pain or dysphagia. Denies fever or chills. No redness or pain at incision sites. Tolerating soft diet. Normal bowel movements.\par Able to burp as needed, happy with results to date, following the post fundoplication diet without difficulty

## 2023-05-25 NOTE — PLAN
[FreeTextEntry1] : Continue soft and mushy\par Introduced cooked vegetables\par Avoid carbonation and bread like products

## 2023-05-25 NOTE — PHYSICAL EXAM
[Normal Thyroid] : the thyroid was normal [Normal Breath Sounds] : Normal breath sounds [Normal Heart Sounds] : normal heart sounds [Normal Rate and Rhythm] : normal rate and rhythm [No HSM] : no hepatosplenomegaly [No Rash or Lesion] : No rash or lesion [Alert] : alert [Oriented to Person] : oriented to person [Oriented to Place] : oriented to place [Oriented to Time] : oriented to time [Calm] : calm [de-identified] : NAD  [de-identified] : abdomen soft nontender nondistended\par Healing wounds no drainage redness or swelling [de-identified] : no CVAT  [de-identified] : grossly intact

## 2023-06-22 ENCOUNTER — APPOINTMENT (OUTPATIENT)
Dept: SURGERY | Facility: CLINIC | Age: 58
End: 2023-06-22
Payer: COMMERCIAL

## 2023-06-22 VITALS
OXYGEN SATURATION: 98 % | HEART RATE: 65 BPM | TEMPERATURE: 98.7 F | RESPIRATION RATE: 17 BRPM | SYSTOLIC BLOOD PRESSURE: 130 MMHG | DIASTOLIC BLOOD PRESSURE: 82 MMHG

## 2023-06-22 DIAGNOSIS — K44.9 DIAPHRAGMATIC HERNIA W/OUT OBSTRUCTION OR GANGRENE: ICD-10-CM

## 2023-06-22 DIAGNOSIS — K21.9 DIAPHRAGMATIC HERNIA W/OUT OBSTRUCTION OR GANGRENE: ICD-10-CM

## 2023-06-22 PROCEDURE — 99024 POSTOP FOLLOW-UP VISIT: CPT

## 2023-06-22 NOTE — PLAN
[FreeTextEntry1] : Continue post fundoplication diet\par Continue antireflux lifestyle changes, no late meals, no recumbency after eating.\par Avoid bread and bubbles\par Continue off PPIs\par Follow-up 2 months

## 2023-06-22 NOTE — HISTORY OF PRESENT ILLNESS
[de-identified] : Ms. AUDREY DILLON is a 57 year-old woman who presented with GERD & dyspepsia s/p robot-assisted laparoscopic hiatal hernia repair with Toupet fundoplication on 5/10/23, who comes in today for a post op visit. She denies pain or dysphagia. Feels bloated when eating breads & roast beef, otherwise tolerating diet. No redness or drainage to laparoscopic incision sites. \par Not taking any antiacid medication\par Avoiding bubbles but not bread, is burping after some meals.\par \par

## 2023-06-22 NOTE — PHYSICAL EXAM
[Normal Thyroid] : the thyroid was normal [Normal Breath Sounds] : Normal breath sounds [Normal Heart Sounds] : normal heart sounds [No Rash or Lesion] : No rash or lesion [Alert] : alert [Oriented to Person] : oriented to person [Oriented to Place] : oriented to place [Oriented to Time] : oriented to time [Calm] : calm [de-identified] : NAD  [de-identified] : anicteric, mucous membranes moist  [de-identified] : abdomen soft nontender nondistended [de-identified] : no CVAT  [de-identified] : grossly intact

## 2023-08-31 ENCOUNTER — APPOINTMENT (OUTPATIENT)
Dept: SURGERY | Facility: CLINIC | Age: 58
End: 2023-08-31
Payer: COMMERCIAL

## 2023-08-31 VITALS
RESPIRATION RATE: 17 BRPM | SYSTOLIC BLOOD PRESSURE: 144 MMHG | BODY MASS INDEX: 24.15 KG/M2 | HEIGHT: 60 IN | HEART RATE: 67 BPM | WEIGHT: 123 LBS | OXYGEN SATURATION: 100 % | TEMPERATURE: 97.3 F | DIASTOLIC BLOOD PRESSURE: 76 MMHG

## 2023-08-31 DIAGNOSIS — Z09 ENCOUNTER FOR FOLLOW-UP EXAMINATION AFTER COMPLETED TREATMENT FOR CONDITIONS OTHER THAN MALIGNANT NEOPLASM: ICD-10-CM

## 2023-08-31 DIAGNOSIS — Z98.890 OTHER SPECIFIED POSTPROCEDURAL STATES: ICD-10-CM

## 2023-08-31 PROCEDURE — 99213 OFFICE O/P EST LOW 20 MIN: CPT

## 2023-08-31 RX ORDER — OMEPRAZOLE 40 MG/1
40 CAPSULE, DELAYED RELEASE ORAL
Qty: 30 | Refills: 3 | Status: ACTIVE | COMMUNITY
Start: 2023-08-31 | End: 1900-01-01

## 2023-08-31 NOTE — PLAN
[FreeTextEntry1] : Resume omeprazole No bread or carbonated beverages No recumbency for 2+ hours after meal Decreased p.o. intake especially evening meal Start probiotics x2 weeks for excessive flatulence Patient to call in 6 weeks if no better for CT scan Follow-up endoscopy to rule out anatomic abnormality versus ulcer Follow-up 3 months

## 2023-08-31 NOTE — PHYSICAL EXAM
[Normal Thyroid] : the thyroid was normal [Normal Breath Sounds] : Normal breath sounds [Normal Heart Sounds] : normal heart sounds [No Rash or Lesion] : No rash or lesion [Alert] : alert [Oriented to Person] : oriented to person [Oriented to Place] : oriented to place [Oriented to Time] : oriented to time [Calm] : calm [de-identified] : NAD [de-identified] : anicteric, mucous membranes moist  [de-identified] : abdomen soft nontender nondistended [de-identified] : no CVAT  [de-identified] : grossly intact

## 2023-08-31 NOTE — ASSESSMENT
[FreeTextEntry1] : 3-month status post reduction and repair of hiatal hernia with partial fundoplication now complaining of reflux symptoms as well as excess flatulence

## 2023-09-09 NOTE — HISTORY OF PRESENT ILLNESS
[de-identified] : 54 y/o woman presents for annual physical exam. she feels well overall currently, no new concerns. not taking any rx currently. \par \par occasional hand arthralgia likely due to OA of hand \par \par she just saw her GYN for yearly screening and had mammography, breast US done this week. monitoring breast cyst and she was told results were stable on the recent imaging \par \par she has not had screening colonoscopy, she is concerned about the procedure  Yes

## 2023-12-06 ENCOUNTER — APPOINTMENT (OUTPATIENT)
Dept: OTOLARYNGOLOGY | Facility: CLINIC | Age: 58
End: 2023-12-06
Payer: COMMERCIAL

## 2023-12-06 VITALS
BODY MASS INDEX: 23.95 KG/M2 | HEIGHT: 60 IN | HEART RATE: 65 BPM | SYSTOLIC BLOOD PRESSURE: 139 MMHG | DIASTOLIC BLOOD PRESSURE: 80 MMHG | TEMPERATURE: 98.2 F | WEIGHT: 122 LBS

## 2023-12-06 DIAGNOSIS — K21.9 GASTRO-ESOPHAGEAL REFLUX DISEASE W/OUT ESOPHAGITIS: ICD-10-CM

## 2023-12-06 PROCEDURE — 31575 DIAGNOSTIC LARYNGOSCOPY: CPT

## 2023-12-06 PROCEDURE — 99203 OFFICE O/P NEW LOW 30 MIN: CPT | Mod: 25

## (undated) DEVICE — GOWN TRIMAX LG

## (undated) DEVICE — GLV 7.5 PROTEXIS (WHITE)

## (undated) DEVICE — NDL COUNTER FOAM AND MAGNET 40-70

## (undated) DEVICE — DRAPE BACK TABLE COVER HEAVY DUTY 60"

## (undated) DEVICE — SOL IRR POUR NS 0.9% 500ML

## (undated) DEVICE — XI ARM PERMANENT CAUTERY HOOK

## (undated) DEVICE — SCOPE WARMER SEAL DISP

## (undated) DEVICE — SUT TICRON 0 30" GS-22

## (undated) DEVICE — DRAIN PENROSE .25" X 18" LATEX

## (undated) DEVICE — DRAPE 1/2 SHEET 40X57"

## (undated) DEVICE — XI ARM NEEDLE DRIVER LARGE

## (undated) DEVICE — XI DRAPE ARM

## (undated) DEVICE — SUT VLOC PBT 0 6" GS-22 BLUE

## (undated) DEVICE — NDL HYPO SAFE 22G X 1.5" (BLACK)

## (undated) DEVICE — SUT VLOC 180 0 9" GS-21 GREEN

## (undated) DEVICE — DRAPE INSTRUMENT POUCH 6.75" X 11"

## (undated) DEVICE — VALVE YELLOW PORT SEAL PLUS 5MM

## (undated) DEVICE — XI ARM PERMANENT CAUTERY SPATULA

## (undated) DEVICE — PACK BASIN SPECIAL PROCEDURE

## (undated) DEVICE — VISIGI 3D SLEEVE GASTRECTOMY 36FR

## (undated) DEVICE — XI VESSEL SEALER

## (undated) DEVICE — DRSG STERISTRIPS 0.5 X 4"

## (undated) DEVICE — STAPLER SKIN VISI-STAT 35 WIDE

## (undated) DEVICE — XI TIP COVER

## (undated) DEVICE — XI ARM FORCEP MARYLAND BIPOLAR

## (undated) DEVICE — XI OBTURATOR OPTICAL BLADELESS 8MM

## (undated) DEVICE — DRSG OPSITE 2.5 X 2"

## (undated) DEVICE — LUBRICATING JELLY ONESHOT 1.25OZ

## (undated) DEVICE — LAP PAD 18 X 18"

## (undated) DEVICE — MEDICATION LABELS W MARKER

## (undated) DEVICE — XI DRAPE COLUMN

## (undated) DEVICE — SUT VICRYL 3-0 27" CT-2 UNDYED

## (undated) DEVICE — ENDOCATCH 10MM SPECIMEN POUCH

## (undated) DEVICE — TUBING SUCTION 20FT

## (undated) DEVICE — POSITIONER FOAM EGG CRATE ULNAR 2PCS (PINK)

## (undated) DEVICE — XI ARM DISSECTOR CURVED BIPOLAR 8MM

## (undated) DEVICE — MARKING PEN W RULER

## (undated) DEVICE — DRAPE 3/4 SHEET W REINFORCEMENT 56X77"

## (undated) DEVICE — LUBRICANT INST ELECTROLUBE Z SOLUTION

## (undated) DEVICE — XI ARM NEEDLE DRIVER MEGA

## (undated) DEVICE — XI ARM CLIP APPLIER MEDIUM-LARGE

## (undated) DEVICE — XI ARM CLIP APPLIER LARGE

## (undated) DEVICE — TUBING INSUFFLATION LAP FILTER 10FT

## (undated) DEVICE — SUCTION YANKAUER NO CONTROL VENT

## (undated) DEVICE — D HELP - CLEARVIEW CLEARIFY SYSTEM

## (undated) DEVICE — SPECIMEN CONTAINER 100ML

## (undated) DEVICE — XI ARM GRASPER TIP UP FENESTRATED

## (undated) DEVICE — DRSG MASTISOL

## (undated) DEVICE — FOLEY TRAY 16FR 5CC LTX UMETER CLOSED

## (undated) DEVICE — GLV 8 PROTEXIS (WHITE)

## (undated) DEVICE — SOL IRR POUR H2O 250ML

## (undated) DEVICE — XI ARM SCISSOR MONO CURVED

## (undated) DEVICE — SUT SOFSILK 2-0 30" V-20

## (undated) DEVICE — WARMING BLANKET LOWER ADULT

## (undated) DEVICE — DRSG OPSITE 13.75 X 4"

## (undated) DEVICE — XI ARM FORCEP TENACULUM

## (undated) DEVICE — TROCAR COVIDIEN VERSAPORT BLADELESS OPTICAL 5MM STANDARD

## (undated) DEVICE — XI SEAL UNIV 5- 8 MM

## (undated) DEVICE — TROCAR COVIDIEN VERSAONE BLADED SMOOTH 12MM LONG

## (undated) DEVICE — XI ARM FORCEP PROGRASP 8MM

## (undated) DEVICE — SUT BIOSYN 4-0 18" P-12

## (undated) DEVICE — BLADE SCALPEL SAFETYLOCK #15

## (undated) DEVICE — DRAPE TOWEL BLUE 17" X 24"

## (undated) DEVICE — DRAPE MAYO STAND 30"

## (undated) DEVICE — BLADE SCALPEL SAFETYLOCK #10

## (undated) DEVICE — SUT POLYSORB 0 36" GU-46

## (undated) DEVICE — SHEARS COVIDIEN ENDO SHEAR 5MM X 45CM LONG W MONOPOLAR CAUTERY

## (undated) DEVICE — TUBING STRYKEFLOW II SUCTION / IRRIGATOR

## (undated) DEVICE — INSUFFLATION NDL COVIDIEN STEP 14G FOR STEP/VERSASTEP

## (undated) DEVICE — VISITEC 4X4

## (undated) DEVICE — TROCAR COVIDIEN VERSASTEP PLUS 11MM STANDARD

## (undated) DEVICE — TROCAR COVIDIEN VERSASTEP PLUS 12MM STANDARD

## (undated) DEVICE — PACK ADVANCED LAPAROSCOPIC NS

## (undated) DEVICE — WARMING BLANKET UPPER ADULT

## (undated) DEVICE — PREP CHLORAPREP HI-LITE ORANGE 26ML

## (undated) DEVICE — XI ARM FORCEP FENESTRATED BIPOLAR 8MM

## (undated) DEVICE — DRSG TEGADERM 6"X8"

## (undated) DEVICE — VENODYNE/SCD SLEEVE CALF LARGE